# Patient Record
Sex: FEMALE | Race: WHITE | NOT HISPANIC OR LATINO | URBAN - METROPOLITAN AREA
[De-identification: names, ages, dates, MRNs, and addresses within clinical notes are randomized per-mention and may not be internally consistent; named-entity substitution may affect disease eponyms.]

---

## 2017-01-09 ENCOUNTER — AMBULATORY - HEALTHEAST (OUTPATIENT)
Dept: CARDIOLOGY | Facility: CLINIC | Age: 82
End: 2017-01-09

## 2017-01-11 ENCOUNTER — OFFICE VISIT - HEALTHEAST (OUTPATIENT)
Dept: CARDIOLOGY | Facility: CLINIC | Age: 82
End: 2017-01-11

## 2017-01-11 DIAGNOSIS — I25.10 CORONARY ATHEROSCLEROSIS: ICD-10-CM

## 2017-01-11 DIAGNOSIS — R07.2 PRECORDIAL PAIN: ICD-10-CM

## 2017-01-11 DIAGNOSIS — R55 NEAR SYNCOPE: ICD-10-CM

## 2017-01-11 DIAGNOSIS — R00.1 BRADYCARDIA BY ELECTROCARDIOGRAM: ICD-10-CM

## 2017-01-11 ASSESSMENT — MIFFLIN-ST. JEOR: SCORE: 1124.01

## 2017-01-13 ENCOUNTER — HOSPITAL ENCOUNTER (OUTPATIENT)
Dept: NUCLEAR MEDICINE | Facility: CLINIC | Age: 82
Discharge: HOME | End: 2017-01-13
Attending: INTERNAL MEDICINE

## 2017-01-13 ENCOUNTER — HOSPITAL ENCOUNTER (OUTPATIENT)
Dept: CARDIOLOGY | Facility: CLINIC | Age: 82
Discharge: HOME | End: 2017-01-13
Attending: INTERNAL MEDICINE

## 2017-01-13 DIAGNOSIS — R55 NEAR SYNCOPE: ICD-10-CM

## 2017-01-13 DIAGNOSIS — R00.1 BRADYCARDIA BY ELECTROCARDIOGRAM: ICD-10-CM

## 2017-01-13 DIAGNOSIS — R07.2 PRECORDIAL PAIN: ICD-10-CM

## 2017-01-13 LAB
CV STRESS CURRENT BP HE: NORMAL
CV STRESS CURRENT HR HE: 65
CV STRESS CURRENT HR HE: 69
CV STRESS CURRENT HR HE: 81
CV STRESS CURRENT HR HE: 82
CV STRESS CURRENT HR HE: 84
CV STRESS CURRENT HR HE: 84
CV STRESS CURRENT HR HE: 85
CV STRESS CURRENT HR HE: 86
CV STRESS CURRENT HR HE: 87
CV STRESS CURRENT HR HE: 88
CV STRESS CURRENT HR HE: 89
CV STRESS CURRENT HR HE: 90
CV STRESS CURRENT HR HE: 91
CV STRESS DEVIATION TIME HE: NORMAL
CV STRESS EXERCISE STAGE HE: NORMAL
CV STRESS FINAL RESTING BP HE: NORMAL
CV STRESS FINAL RESTING HR HE: 81
CV STRESS MAX TREADMILL GRADE HE: 0
CV STRESS MAX TREADMILL SPEED HE: 0
CV STRESS PEAK DIA BP HE: NORMAL
CV STRESS PEAK SYS BP HE: NORMAL
CV STRESS PHASE HE: NORMAL
CV STRESS PROTOCOL HE: NORMAL
CV STRESS RESTING PT POSITION HE: NORMAL
CV STRESS ST DEVIATION AMOUNT HE: NORMAL
CV STRESS ST DEVIATION ELEVATION HE: NORMAL
CV STRESS ST EVELATION AMOUNT HE: NORMAL
CV STRESS TEST TYPE HE: NORMAL
CV STRESS TOTAL STAGE TIME MIN 1 HE: NORMAL
NUC STRESS EJECTION FRACTION: 70 %
STRESS ECHO BASELINE BP: NORMAL
STRESS ECHO BASELINE HR: 67
STRESS ECHO LAST STRESS BP: NORMAL
STRESS ECHO LAST STRESS HR: 87

## 2017-01-19 ENCOUNTER — COMMUNICATION - HEALTHEAST (OUTPATIENT)
Dept: CARDIOLOGY | Facility: CLINIC | Age: 82
End: 2017-01-19

## 2017-01-26 ENCOUNTER — COMMUNICATION - HEALTHEAST (OUTPATIENT)
Dept: CARDIOLOGY | Facility: CLINIC | Age: 82
End: 2017-01-26

## 2017-11-02 ENCOUNTER — RECORDS - HEALTHEAST (OUTPATIENT)
Dept: LAB | Facility: CLINIC | Age: 82
End: 2017-11-02

## 2017-11-02 LAB
CHOLEST SERPL-MCNC: 138 MG/DL
FASTING STATUS PATIENT QL REPORTED: ABNORMAL
HDLC SERPL-MCNC: 38 MG/DL
LDLC SERPL CALC-MCNC: 70 MG/DL
TRIGL SERPL-MCNC: 149 MG/DL

## 2018-03-07 ENCOUNTER — COMMUNICATION - HEALTHEAST (OUTPATIENT)
Dept: ADMINISTRATIVE | Facility: CLINIC | Age: 83
End: 2018-03-07

## 2018-06-04 ENCOUNTER — AMBULATORY - HEALTHEAST (OUTPATIENT)
Dept: CARDIOLOGY | Facility: CLINIC | Age: 83
End: 2018-06-04

## 2018-06-04 ENCOUNTER — RECORDS - HEALTHEAST (OUTPATIENT)
Dept: ADMINISTRATIVE | Facility: OTHER | Age: 83
End: 2018-06-04

## 2018-06-05 ENCOUNTER — OFFICE VISIT - HEALTHEAST (OUTPATIENT)
Dept: CARDIOLOGY | Facility: CLINIC | Age: 83
End: 2018-06-05

## 2018-06-05 DIAGNOSIS — I25.10 ATHEROSCLEROSIS OF NATIVE CORONARY ARTERY OF NATIVE HEART WITHOUT ANGINA PECTORIS: ICD-10-CM

## 2018-06-05 ASSESSMENT — MIFFLIN-ST. JEOR: SCORE: 1124.01

## 2018-12-18 ENCOUNTER — RECORDS - HEALTHEAST (OUTPATIENT)
Dept: LAB | Facility: CLINIC | Age: 83
End: 2018-12-18

## 2018-12-18 LAB
ANION GAP SERPL CALCULATED.3IONS-SCNC: 9 MMOL/L (ref 5–18)
BUN SERPL-MCNC: 14 MG/DL (ref 8–28)
CALCIUM SERPL-MCNC: 9.8 MG/DL (ref 8.5–10.5)
CHLORIDE BLD-SCNC: 110 MMOL/L (ref 98–107)
CHOLEST SERPL-MCNC: 139 MG/DL
CO2 SERPL-SCNC: 23 MMOL/L (ref 22–31)
CREAT SERPL-MCNC: 0.84 MG/DL (ref 0.6–1.1)
FASTING STATUS PATIENT QL REPORTED: ABNORMAL
GFR SERPL CREATININE-BSD FRML MDRD: >60 ML/MIN/1.73M2
GLUCOSE BLD-MCNC: 100 MG/DL (ref 70–125)
HDLC SERPL-MCNC: 42 MG/DL
LDLC SERPL CALC-MCNC: 74 MG/DL
POTASSIUM BLD-SCNC: 4.5 MMOL/L (ref 3.5–5)
SODIUM SERPL-SCNC: 142 MMOL/L (ref 136–145)
TRIGL SERPL-MCNC: 115 MG/DL

## 2019-01-10 ENCOUNTER — AMBULATORY - HEALTHEAST (OUTPATIENT)
Dept: CARDIOLOGY | Facility: CLINIC | Age: 84
End: 2019-01-10

## 2019-01-11 ENCOUNTER — HOSPITAL ENCOUNTER (OUTPATIENT)
Dept: NUCLEAR MEDICINE | Facility: CLINIC | Age: 84
Discharge: HOME | End: 2019-01-11
Attending: INTERNAL MEDICINE

## 2019-01-11 ENCOUNTER — AMBULATORY - HEALTHEAST (OUTPATIENT)
Dept: CARDIOLOGY | Facility: CLINIC | Age: 84
End: 2019-01-11

## 2019-01-11 ENCOUNTER — RECORDS - HEALTHEAST (OUTPATIENT)
Dept: ADMINISTRATIVE | Facility: OTHER | Age: 84
End: 2019-01-11

## 2019-01-11 ENCOUNTER — OFFICE VISIT - HEALTHEAST (OUTPATIENT)
Dept: CARDIOLOGY | Facility: CLINIC | Age: 84
End: 2019-01-11

## 2019-01-11 ENCOUNTER — HOSPITAL ENCOUNTER (OUTPATIENT)
Dept: CARDIOLOGY | Facility: CLINIC | Age: 84
Discharge: HOME | End: 2019-01-11
Attending: INTERNAL MEDICINE

## 2019-01-11 DIAGNOSIS — R07.89 CHEST WALL PAIN: ICD-10-CM

## 2019-01-11 DIAGNOSIS — E78.5 DYSLIPIDEMIA, GOAL LDL BELOW 70: ICD-10-CM

## 2019-01-11 DIAGNOSIS — R94.39 ABNORMAL CARDIOVASCULAR STRESS TEST: ICD-10-CM

## 2019-01-11 DIAGNOSIS — I25.119 ATHEROSCLEROSIS OF NATIVE CORONARY ARTERY OF NATIVE HEART WITH ANGINA PECTORIS (H): ICD-10-CM

## 2019-01-11 DIAGNOSIS — Z86.73 HISTORY OF STROKE: ICD-10-CM

## 2019-01-11 DIAGNOSIS — R07.2 PRECORDIAL CHEST PAIN: ICD-10-CM

## 2019-01-11 DIAGNOSIS — I10 ESSENTIAL HYPERTENSION: ICD-10-CM

## 2019-01-11 LAB
CV STRESS CURRENT BP HE: NORMAL
CV STRESS CURRENT HR HE: 57
CV STRESS CURRENT HR HE: 59
CV STRESS CURRENT HR HE: 59
CV STRESS CURRENT HR HE: 62
CV STRESS CURRENT HR HE: 72
CV STRESS CURRENT HR HE: 76
CV STRESS CURRENT HR HE: 76
CV STRESS CURRENT HR HE: 77
CV STRESS CURRENT HR HE: 77
CV STRESS CURRENT HR HE: 79
CV STRESS CURRENT HR HE: 80
CV STRESS CURRENT HR HE: 81
CV STRESS DEVIATION TIME HE: NORMAL
CV STRESS ECHO PERCENT HR HE: NORMAL
CV STRESS EXERCISE STAGE HE: NORMAL
CV STRESS FINAL RESTING BP HE: NORMAL
CV STRESS FINAL RESTING HR HE: 76
CV STRESS MAX HR HE: 82
CV STRESS MAX TREADMILL GRADE HE: 0
CV STRESS MAX TREADMILL SPEED HE: 0
CV STRESS PEAK DIA BP HE: NORMAL
CV STRESS PEAK SYS BP HE: NORMAL
CV STRESS PHASE HE: NORMAL
CV STRESS PROTOCOL HE: NORMAL
CV STRESS RESTING PT POSITION HE: NORMAL
CV STRESS RESTING PT POSITION HE: NORMAL
CV STRESS ST DEVIATION AMOUNT HE: NORMAL
CV STRESS ST DEVIATION ELEVATION HE: NORMAL
CV STRESS ST EVELATION AMOUNT HE: NORMAL
CV STRESS TEST TYPE HE: NORMAL
CV STRESS TOTAL STAGE TIME MIN 1 HE: NORMAL
NUC STRESS EJECTION FRACTION: 65 %
STRESS ECHO BASELINE BP: NORMAL
STRESS ECHO BASELINE HR: 59
STRESS ECHO CALCULATED PERCENT HR: 60 %
STRESS ECHO LAST STRESS BP: NORMAL
STRESS ECHO LAST STRESS HR: 81

## 2019-01-11 ASSESSMENT — MIFFLIN-ST. JEOR: SCORE: 1101.33

## 2019-01-14 ENCOUNTER — COMMUNICATION - HEALTHEAST (OUTPATIENT)
Dept: CARDIOLOGY | Facility: CLINIC | Age: 84
End: 2019-01-14

## 2019-01-14 DIAGNOSIS — I20.0 UNSTABLE ANGINA (H): ICD-10-CM

## 2019-01-22 ENCOUNTER — SURGERY - HEALTHEAST (OUTPATIENT)
Dept: CARDIOLOGY | Facility: CLINIC | Age: 84
End: 2019-01-22

## 2019-01-22 ENCOUNTER — AMBULATORY - HEALTHEAST (OUTPATIENT)
Dept: CARDIOLOGY | Facility: CLINIC | Age: 84
End: 2019-01-22

## 2019-01-22 ASSESSMENT — MIFFLIN-ST. JEOR
SCORE: 1098.6
SCORE: 1098.6

## 2019-01-23 ENCOUNTER — AMBULATORY - HEALTHEAST (OUTPATIENT)
Dept: CARDIOLOGY | Facility: CLINIC | Age: 84
End: 2019-01-23

## 2019-01-23 ENCOUNTER — ANESTHESIA - HEALTHEAST (OUTPATIENT)
Dept: SURGERY | Facility: CLINIC | Age: 84
End: 2019-01-23

## 2019-01-23 DIAGNOSIS — I65.29 STENOSIS OF CAROTID ARTERY, UNSPECIFIED LATERALITY: ICD-10-CM

## 2019-01-24 ENCOUNTER — SURGERY - HEALTHEAST (OUTPATIENT)
Dept: SURGERY | Facility: CLINIC | Age: 84
End: 2019-01-24

## 2019-01-24 ASSESSMENT — MIFFLIN-ST. JEOR
SCORE: 1093.61
SCORE: 1093.61

## 2019-01-25 ASSESSMENT — MIFFLIN-ST. JEOR
SCORE: 1131.26
SCORE: 1131.26

## 2019-01-26 ASSESSMENT — MIFFLIN-ST. JEOR
SCORE: 1138.07
SCORE: 1138.07

## 2019-01-27 ASSESSMENT — MIFFLIN-ST. JEOR
SCORE: 1156.04
SCORE: 1156.04

## 2019-01-28 ASSESSMENT — MIFFLIN-ST. JEOR
SCORE: 1152.58
SCORE: 1152.58

## 2019-01-29 ASSESSMENT — MIFFLIN-ST. JEOR
SCORE: 1159.39
SCORE: 1159.39

## 2019-02-06 ENCOUNTER — RECORDS - HEALTHEAST (OUTPATIENT)
Dept: LAB | Facility: CLINIC | Age: 84
End: 2019-02-06

## 2019-02-06 LAB
ALBUMIN SERPL-MCNC: 3.6 G/DL (ref 3.5–5)
ANION GAP SERPL CALCULATED.3IONS-SCNC: 12 MMOL/L (ref 5–18)
BUN SERPL-MCNC: 18 MG/DL (ref 8–28)
CALCIUM SERPL-MCNC: 10 MG/DL (ref 8.5–10.5)
CHLORIDE BLD-SCNC: 108 MMOL/L (ref 98–107)
CO2 SERPL-SCNC: 20 MMOL/L (ref 22–31)
CREAT SERPL-MCNC: 1.07 MG/DL (ref 0.6–1.1)
ERYTHROCYTE [DISTWIDTH] IN BLOOD BY AUTOMATED COUNT: 15.3 % (ref 11–14.5)
GFR SERPL CREATININE-BSD FRML MDRD: 49 ML/MIN/1.73M2
GLUCOSE BLD-MCNC: 103 MG/DL (ref 70–125)
HCT VFR BLD AUTO: 36.2 % (ref 35–47)
HGB BLD-MCNC: 11.1 G/DL (ref 12–16)
MCH RBC QN AUTO: 28.8 PG (ref 27–34)
MCHC RBC AUTO-ENTMCNC: 30.7 G/DL (ref 32–36)
MCV RBC AUTO: 94 FL (ref 80–100)
PHOSPHATE SERPL-MCNC: 3.7 MG/DL (ref 2.5–4.5)
PLATELET # BLD AUTO: 567 THOU/UL (ref 140–440)
PMV BLD AUTO: 10.8 FL (ref 8.5–12.5)
POTASSIUM BLD-SCNC: 4.9 MMOL/L (ref 3.5–5)
RBC # BLD AUTO: 3.86 MILL/UL (ref 3.8–5.4)
SODIUM SERPL-SCNC: 140 MMOL/L (ref 136–145)
WBC: 10.9 THOU/UL (ref 4–11)

## 2019-02-19 ENCOUNTER — OFFICE VISIT - HEALTHEAST (OUTPATIENT)
Dept: CARDIOLOGY | Facility: CLINIC | Age: 84
End: 2019-02-19

## 2019-02-19 DIAGNOSIS — Z95.1 S/P CABG (CORONARY ARTERY BYPASS GRAFT): ICD-10-CM

## 2019-02-19 RX ORDER — METOPROLOL TARTRATE 25 MG/1
12.5 TABLET, FILM COATED ORAL DAILY
Status: SHIPPED | COMMUNITY
Start: 2019-02-19

## 2019-02-19 ASSESSMENT — MIFFLIN-ST. JEOR: SCORE: 1086.81

## 2019-02-20 ENCOUNTER — RECORDS - HEALTHEAST (OUTPATIENT)
Dept: LAB | Facility: CLINIC | Age: 84
End: 2019-02-20

## 2019-02-20 LAB
ANION GAP SERPL CALCULATED.3IONS-SCNC: 8 MMOL/L (ref 5–18)
BUN SERPL-MCNC: 18 MG/DL (ref 8–28)
CALCIUM SERPL-MCNC: 10.4 MG/DL (ref 8.5–10.5)
CHLORIDE BLD-SCNC: 110 MMOL/L (ref 98–107)
CO2 SERPL-SCNC: 25 MMOL/L (ref 22–31)
CREAT SERPL-MCNC: 0.91 MG/DL (ref 0.6–1.1)
GFR SERPL CREATININE-BSD FRML MDRD: 59 ML/MIN/1.73M2
GLUCOSE BLD-MCNC: 98 MG/DL (ref 70–125)
POTASSIUM BLD-SCNC: 4.8 MMOL/L (ref 3.5–5)
SODIUM SERPL-SCNC: 143 MMOL/L (ref 136–145)

## 2019-03-05 ENCOUNTER — AMBULATORY - HEALTHEAST (OUTPATIENT)
Dept: CARDIOLOGY | Facility: CLINIC | Age: 84
End: 2019-03-05

## 2019-03-05 ENCOUNTER — RECORDS - HEALTHEAST (OUTPATIENT)
Dept: ADMINISTRATIVE | Facility: OTHER | Age: 84
End: 2019-03-05

## 2019-03-11 ENCOUNTER — HOSPITAL ENCOUNTER (OUTPATIENT)
Dept: ULTRASOUND IMAGING | Facility: CLINIC | Age: 84
Discharge: HOME | End: 2019-03-11
Attending: INTERNAL MEDICINE

## 2019-03-11 ENCOUNTER — OFFICE VISIT - HEALTHEAST (OUTPATIENT)
Dept: CARDIOLOGY | Facility: CLINIC | Age: 84
End: 2019-03-11

## 2019-03-11 DIAGNOSIS — I25.10 CORONARY ARTERY DISEASE DUE TO CALCIFIED CORONARY LESION: ICD-10-CM

## 2019-03-11 DIAGNOSIS — Z95.1 STATUS POST CARDIAC REVASCULARIZATION WITH BYPASS AORTOCORONARY ANASTOMOSIS OF FIVE CORONARY VESSELS: ICD-10-CM

## 2019-03-11 DIAGNOSIS — I82.402 ACUTE EMBOLISM AND THROMBOSIS OF DEEP VEIN OF LEFT LOWER EXTREMITY (H): ICD-10-CM

## 2019-03-11 DIAGNOSIS — I25.84 CORONARY ARTERY DISEASE DUE TO CALCIFIED CORONARY LESION: ICD-10-CM

## 2019-03-11 ASSESSMENT — MIFFLIN-ST. JEOR: SCORE: 1092.25

## 2019-03-19 ENCOUNTER — AMBULATORY - HEALTHEAST (OUTPATIENT)
Dept: CARDIAC REHAB | Facility: CLINIC | Age: 84
End: 2019-03-19

## 2019-06-17 ENCOUNTER — COMMUNICATION - HEALTHEAST (OUTPATIENT)
Dept: ADMINISTRATIVE | Facility: CLINIC | Age: 84
End: 2019-06-17

## 2019-12-12 ENCOUNTER — RECORDS - HEALTHEAST (OUTPATIENT)
Dept: LAB | Facility: CLINIC | Age: 84
End: 2019-12-12

## 2019-12-12 LAB
ANION GAP SERPL CALCULATED.3IONS-SCNC: 7 MMOL/L (ref 5–18)
BUN SERPL-MCNC: 13 MG/DL (ref 8–28)
CALCIUM SERPL-MCNC: 9.8 MG/DL (ref 8.5–10.5)
CHLORIDE BLD-SCNC: 111 MMOL/L (ref 98–107)
CHOLEST SERPL-MCNC: 111 MG/DL
CO2 SERPL-SCNC: 24 MMOL/L (ref 22–31)
CREAT SERPL-MCNC: 0.87 MG/DL (ref 0.6–1.1)
FASTING STATUS PATIENT QL REPORTED: ABNORMAL
GFR SERPL CREATININE-BSD FRML MDRD: >60 ML/MIN/1.73M2
GLUCOSE BLD-MCNC: 120 MG/DL (ref 70–125)
HDLC SERPL-MCNC: 36 MG/DL
LDLC SERPL CALC-MCNC: 50 MG/DL
POTASSIUM BLD-SCNC: 4.6 MMOL/L (ref 3.5–5)
SODIUM SERPL-SCNC: 142 MMOL/L (ref 136–145)
TRIGL SERPL-MCNC: 126 MG/DL

## 2020-05-13 ENCOUNTER — COMMUNICATION - HEALTHEAST (OUTPATIENT)
Dept: CARDIOLOGY | Facility: CLINIC | Age: 85
End: 2020-05-13

## 2020-05-14 ENCOUNTER — RECORDS - HEALTHEAST (OUTPATIENT)
Dept: ADMINISTRATIVE | Facility: OTHER | Age: 85
End: 2020-05-14

## 2020-05-14 ENCOUNTER — AMBULATORY - HEALTHEAST (OUTPATIENT)
Dept: CARDIOLOGY | Facility: CLINIC | Age: 85
End: 2020-05-14

## 2020-05-18 ENCOUNTER — OFFICE VISIT - HEALTHEAST (OUTPATIENT)
Dept: CARDIOLOGY | Facility: CLINIC | Age: 85
End: 2020-05-18

## 2020-05-18 DIAGNOSIS — Z95.1 S/P CABG (CORONARY ARTERY BYPASS GRAFT): ICD-10-CM

## 2020-05-18 DIAGNOSIS — R07.9 CHEST PAIN, UNSPECIFIED TYPE: ICD-10-CM

## 2020-05-18 RX ORDER — PRAVASTATIN SODIUM 80 MG/1
80 TABLET ORAL AT BEDTIME
Status: SHIPPED | COMMUNITY
Start: 2020-02-21

## 2020-05-18 ASSESSMENT — MIFFLIN-ST. JEOR: SCORE: 1092.25

## 2020-05-21 ENCOUNTER — HOSPITAL ENCOUNTER (OUTPATIENT)
Dept: NUCLEAR MEDICINE | Facility: CLINIC | Age: 85
Discharge: HOME | End: 2020-05-21
Attending: INTERNAL MEDICINE

## 2020-05-21 ENCOUNTER — HOSPITAL ENCOUNTER (OUTPATIENT)
Dept: CARDIOLOGY | Facility: CLINIC | Age: 85
Discharge: HOME | End: 2020-05-21
Attending: INTERNAL MEDICINE

## 2020-05-21 DIAGNOSIS — R07.9 CHEST PAIN, UNSPECIFIED TYPE: ICD-10-CM

## 2020-05-21 DIAGNOSIS — Z95.1 S/P CABG (CORONARY ARTERY BYPASS GRAFT): ICD-10-CM

## 2020-05-21 LAB
CV STRESS CURRENT BP HE: NORMAL
CV STRESS CURRENT HR HE: 53
CV STRESS CURRENT HR HE: 53
CV STRESS CURRENT HR HE: 56
CV STRESS CURRENT HR HE: 70
CV STRESS CURRENT HR HE: 70
CV STRESS CURRENT HR HE: 71
CV STRESS CURRENT HR HE: 73
CV STRESS CURRENT HR HE: 74
CV STRESS CURRENT HR HE: 74
CV STRESS CURRENT HR HE: 75
CV STRESS CURRENT HR HE: 77
CV STRESS CURRENT HR HE: 77
CV STRESS CURRENT HR HE: 78
CV STRESS CURRENT HR HE: 79
CV STRESS CURRENT HR HE: 81
CV STRESS DEVIATION TIME HE: NORMAL
CV STRESS ECHO PERCENT HR HE: NORMAL
CV STRESS EXERCISE STAGE HE: NORMAL
CV STRESS FINAL RESTING BP HE: NORMAL
CV STRESS FINAL RESTING HR HE: 70
CV STRESS MAX HR HE: 82
CV STRESS MAX TREADMILL GRADE HE: 0
CV STRESS MAX TREADMILL SPEED HE: 0
CV STRESS PEAK DIA BP HE: NORMAL
CV STRESS PEAK SYS BP HE: NORMAL
CV STRESS PHASE HE: NORMAL
CV STRESS PROTOCOL HE: NORMAL
CV STRESS RESTING PT POSITION HE: NORMAL
CV STRESS RESTING PT POSITION HE: NORMAL
CV STRESS ST DEVIATION AMOUNT HE: NORMAL
CV STRESS ST DEVIATION ELEVATION HE: NORMAL
CV STRESS ST EVELATION AMOUNT HE: NORMAL
CV STRESS TEST TYPE HE: NORMAL
CV STRESS TOTAL STAGE TIME MIN 1 HE: NORMAL
RATE PRESSURE PRODUCT: NORMAL
STRESS ECHO BASELINE DIASTOLIC HE: 82
STRESS ECHO BASELINE HR: 52
STRESS ECHO BASELINE SYSTOLIC BP: 190
STRESS ECHO CALCULATED PERCENT HR: 60 %
STRESS ECHO LAST STRESS DIASTOLIC BP: 75
STRESS ECHO LAST STRESS HR: 79
STRESS ECHO LAST STRESS SYSTOLIC BP: 169
STRESS ECHO TARGET HR: 136

## 2020-06-29 ENCOUNTER — RECORDS - HEALTHEAST (OUTPATIENT)
Dept: ADMINISTRATIVE | Facility: OTHER | Age: 85
End: 2020-06-29

## 2020-07-02 ENCOUNTER — OFFICE VISIT - HEALTHEAST (OUTPATIENT)
Dept: CARDIOLOGY | Facility: CLINIC | Age: 85
End: 2020-07-02

## 2020-07-02 DIAGNOSIS — I25.84 CORONARY ARTERY DISEASE DUE TO CALCIFIED CORONARY LESION: ICD-10-CM

## 2020-07-02 DIAGNOSIS — E78.00 HYPERCHOLESTEROLEMIA: ICD-10-CM

## 2020-07-02 DIAGNOSIS — Z95.1 STATUS POST CARDIAC REVASCULARIZATION WITH BYPASS AORTOCORONARY ANASTOMOSIS OF FIVE CORONARY VESSELS: ICD-10-CM

## 2020-07-02 DIAGNOSIS — I25.10 CORONARY ARTERY DISEASE DUE TO CALCIFIED CORONARY LESION: ICD-10-CM

## 2020-07-02 DIAGNOSIS — K21.00 GASTRO-ESOPHAGEAL REFLUX DISEASE WITH ESOPHAGITIS: ICD-10-CM

## 2020-12-10 ENCOUNTER — RECORDS - HEALTHEAST (OUTPATIENT)
Dept: LAB | Facility: CLINIC | Age: 85
End: 2020-12-10

## 2020-12-10 LAB
ANION GAP SERPL CALCULATED.3IONS-SCNC: 8 MMOL/L (ref 5–18)
BUN SERPL-MCNC: 14 MG/DL (ref 8–28)
CALCIUM SERPL-MCNC: 9.1 MG/DL (ref 8.5–10.5)
CHLORIDE BLD-SCNC: 112 MMOL/L (ref 98–107)
CHOLEST SERPL-MCNC: 122 MG/DL
CO2 SERPL-SCNC: 24 MMOL/L (ref 22–31)
CREAT SERPL-MCNC: 0.84 MG/DL (ref 0.6–1.1)
FASTING STATUS PATIENT QL REPORTED: ABNORMAL
GFR SERPL CREATININE-BSD FRML MDRD: >60 ML/MIN/1.73M2
GLUCOSE BLD-MCNC: 141 MG/DL (ref 70–125)
HDLC SERPL-MCNC: 42 MG/DL
LDLC SERPL CALC-MCNC: 60 MG/DL
POTASSIUM BLD-SCNC: 4.2 MMOL/L (ref 3.5–5)
SODIUM SERPL-SCNC: 144 MMOL/L (ref 136–145)
TRIGL SERPL-MCNC: 102 MG/DL

## 2020-12-29 ENCOUNTER — RECORDS - HEALTHEAST (OUTPATIENT)
Dept: LAB | Facility: CLINIC | Age: 85
End: 2020-12-29

## 2020-12-29 LAB
ERYTHROCYTE [SEDIMENTATION RATE] IN BLOOD BY WESTERGREN METHOD: 8 MM/HR (ref 0–20)
TSH SERPL DL<=0.005 MIU/L-ACNC: 3.04 UIU/ML (ref 0.3–5)

## 2021-01-07 ENCOUNTER — RECORDS - HEALTHEAST (OUTPATIENT)
Dept: LAB | Facility: CLINIC | Age: 86
End: 2021-01-07

## 2021-01-07 LAB
ANION GAP SERPL CALCULATED.3IONS-SCNC: 7 MMOL/L (ref 5–18)
BUN SERPL-MCNC: 15 MG/DL (ref 8–28)
CALCIUM SERPL-MCNC: 9.8 MG/DL (ref 8.5–10.5)
CHLORIDE BLD-SCNC: 112 MMOL/L (ref 98–107)
CO2 SERPL-SCNC: 26 MMOL/L (ref 22–31)
CREAT SERPL-MCNC: 0.97 MG/DL (ref 0.6–1.1)
GFR SERPL CREATININE-BSD FRML MDRD: 55 ML/MIN/1.73M2
GLUCOSE BLD-MCNC: 140 MG/DL (ref 70–125)
POTASSIUM BLD-SCNC: 5 MMOL/L (ref 3.5–5)
SODIUM SERPL-SCNC: 145 MMOL/L (ref 136–145)

## 2021-02-11 ENCOUNTER — RECORDS - HEALTHEAST (OUTPATIENT)
Dept: ADMINISTRATIVE | Facility: OTHER | Age: 86
End: 2021-02-11

## 2021-02-19 ENCOUNTER — OFFICE VISIT - HEALTHEAST (OUTPATIENT)
Dept: CARDIOLOGY | Facility: CLINIC | Age: 86
End: 2021-02-19

## 2021-02-19 DIAGNOSIS — Z95.1 STATUS POST CARDIAC REVASCULARIZATION WITH BYPASS AORTOCORONARY ANASTOMOSIS OF FIVE CORONARY VESSELS: ICD-10-CM

## 2021-02-19 DIAGNOSIS — I10 ESSENTIAL HYPERTENSION, BENIGN: ICD-10-CM

## 2021-02-19 DIAGNOSIS — E78.5 HYPERLIPIDEMIA, UNSPECIFIED HYPERLIPIDEMIA TYPE: ICD-10-CM

## 2021-02-19 ASSESSMENT — MIFFLIN-ST. JEOR: SCORE: 1128.54

## 2021-05-07 ENCOUNTER — COMMUNICATION - HEALTHEAST (OUTPATIENT)
Dept: ADMINISTRATIVE | Facility: CLINIC | Age: 86
End: 2021-05-07

## 2021-05-28 ENCOUNTER — RECORDS - HEALTHEAST (OUTPATIENT)
Dept: ADMINISTRATIVE | Facility: CLINIC | Age: 86
End: 2021-05-28

## 2021-05-29 ENCOUNTER — RECORDS - HEALTHEAST (OUTPATIENT)
Dept: ADMINISTRATIVE | Facility: CLINIC | Age: 86
End: 2021-05-29

## 2021-05-30 ENCOUNTER — RECORDS - HEALTHEAST (OUTPATIENT)
Dept: ADMINISTRATIVE | Facility: CLINIC | Age: 86
End: 2021-05-30

## 2021-05-30 VITALS — HEIGHT: 62 IN | BODY MASS INDEX: 29.44 KG/M2 | WEIGHT: 160 LBS

## 2021-05-31 ENCOUNTER — RECORDS - HEALTHEAST (OUTPATIENT)
Dept: ADMINISTRATIVE | Facility: CLINIC | Age: 86
End: 2021-05-31

## 2021-06-01 VITALS — WEIGHT: 160 LBS | HEIGHT: 62 IN | BODY MASS INDEX: 29.44 KG/M2

## 2021-06-02 VITALS — BODY MASS INDEX: 28.52 KG/M2 | WEIGHT: 155 LBS | HEIGHT: 62 IN

## 2021-06-02 VITALS — BODY MASS INDEX: 27.94 KG/M2 | WEIGHT: 151.8 LBS | HEIGHT: 62 IN

## 2021-06-02 VITALS — HEIGHT: 62 IN | WEIGHT: 153 LBS | BODY MASS INDEX: 28.16 KG/M2

## 2021-06-02 VITALS
WEIGHT: 167.8 LBS | HEIGHT: 62 IN | BODY MASS INDEX: 30.88 KG/M2 | HEIGHT: 62 IN | WEIGHT: 167.8 LBS | BODY MASS INDEX: 30.88 KG/M2

## 2021-06-04 VITALS
SYSTOLIC BLOOD PRESSURE: 146 MMHG | WEIGHT: 153 LBS | RESPIRATION RATE: 16 BRPM | BODY MASS INDEX: 28.16 KG/M2 | HEART RATE: 52 BPM | DIASTOLIC BLOOD PRESSURE: 62 MMHG | HEIGHT: 62 IN

## 2021-06-05 VITALS
DIASTOLIC BLOOD PRESSURE: 58 MMHG | HEIGHT: 62 IN | RESPIRATION RATE: 14 BRPM | HEART RATE: 76 BPM | SYSTOLIC BLOOD PRESSURE: 118 MMHG | BODY MASS INDEX: 29.63 KG/M2 | WEIGHT: 161 LBS

## 2021-06-08 NOTE — TELEPHONE ENCOUNTER
Wellness Screening Tool  Symptom Screening:  Do you have one of the following new symptoms:    Fever or reported chills?  No    A new cough (started within the past 14 days)?  No    Shortness of breath (started within the past 14 days) ?  No     Nausea, vomiting, or diarrhea?  No    Within the past 3 weeks, have you been exposed to someone with a known positive illness below:    COVID-19 (known or suspected)?  No    Chicken pox?  No    Mealses?  No    Pertussis?  No    Patient notified of visitor restrictions:  Yes  If pt asymptomatic, please remind patient to bring in and wear their own mask if they have one available to their appointment: Yes  Patient's appointment status: Patient will be seen in clinic as scheduled on 5/14/20. -opal

## 2021-06-08 NOTE — TELEPHONE ENCOUNTER
----- Message -----  From: CharlesRita foreman  Sent: 5/14/2020   1:31 PM CDT  To: Mee Meeks RN  Subject: Incoming provider call                           Caller: Syl Johnson NP     Primary cardiologist: Dr. Vivas    Detailed reason for call: Syl states Ileana had appt with her yesterday and she has faxed the notes to Dr. Vivas. However, she is concerned Ileana should be seen sooner than the already scheduled 5/28/2020 appt w/cardiology. Please call back today.     Best phone number: [phone]    Best time to contact: Today    Ok to leave a detailed message? Yes    -----------------------------------------------------    Return call to Syl Navarro NP at \[facility]\"". She would like patient evaluated in-clinic sooner than her currently scheduled phone appointment with Dr. Vivas. Patient described symptoms yesterday in visit of abdominal fullness. EKG done at that visit with reported changes. NP concerned patient having angina. Imdur started at 30 mg daily and Metoprolol Tartrate reduced to 12.5 mg daily secondary to some bradycardia.    Patient contacted and assisted in arranging RAC appointment-- she is currently scheduled for tomorrow. She is concerned that she cannot find someone to stay with her ailing  and will call if she needs to reschedule. -opal

## 2021-06-08 NOTE — TELEPHONE ENCOUNTER
"----- Message from Rita Charles sent at 5/13/2020  9:56 AM CDT -----  Regarding: [facility] Pt  Caller: Ileana    Primary cardiologist: Dr. Vivas    Detailed reason for call: Ileana states she is concerned about her low heart rate and high blood pressure. She declined making Video or Tele visit at this time. Please call back.     Best phone number: [phone]    Best time to contact: Today    Ok to leave a detailed message? Yes    Device? N    Additional info: Pt due for f/u with Dr. Vivas March 2020, wants to \"wait for in-office\".  -----------------------------------------------------    Return call to patient to discuss concerns. She reports that her husbands home care nurse took her pulse yesterday an it was 48 bpm. This prompted patient to check her BP which she reports was elevated-- SBP in the 190's. She has since been taking her BP about every hour since then and it continues to be elevated and HR is low. Patient states \"I feel pretty good\", but is concerned about these readings. Advised patient to contact her PCP for evaluation and suggested that she take her BP cuff in to her appointment to check it's accuracy. Explained to patient that PCP can determine need for more urgent cardiology appointment if necessary. Encouraged patient not to check her BP every hour unless she had symptoms-- twice daily and record her readings. Also suggested she schedule follow-up with Dr. Vivas as she is overdue-- will have scheduling staff call patient to arrange. Before ending call reviewed with patient s/s that would require emergent eval. Patient verbalized understanding and agreement with plan.    Dr. Vivas, any other recommendations? -ejb    -----------------------------------------------------  ----- Message -----  From: Aramis Vivas MD  Sent: 5/13/2020  11:59 AM CDT  To: Mee Meeks RN    She should have video visit with Jaymie this week    Return call to patient. She was able to schedule a visit today with " a provider at PCP office. She does not have video capability, but is willing to do Tele Visit. Spoke with scheduling staff and asked that they contact patient to arrange. -opal

## 2021-06-09 ENCOUNTER — RECORDS - HEALTHEAST (OUTPATIENT)
Dept: ADMINISTRATIVE | Facility: CLINIC | Age: 86
End: 2021-06-09

## 2021-06-15 PROBLEM — R00.1 BRADYCARDIA BY ELECTROCARDIOGRAM: Status: ACTIVE | Noted: 2017-01-11

## 2021-06-15 PROBLEM — R07.2 PRECORDIAL CHEST PAIN: Status: ACTIVE | Noted: 2017-01-11

## 2021-06-16 PROBLEM — S06.2XAA: Status: ACTIVE | Noted: 2019-01-30

## 2021-06-16 PROBLEM — R94.39 ABNORMAL CARDIOVASCULAR STRESS TEST: Status: ACTIVE | Noted: 2019-01-11

## 2021-06-16 PROBLEM — Z86.73 HISTORY OF STROKE: Status: ACTIVE | Noted: 2019-01-11

## 2021-06-16 PROBLEM — K21.00 GASTRO-ESOPHAGEAL REFLUX DISEASE WITH ESOPHAGITIS: Status: ACTIVE | Noted: 2019-01-30

## 2021-06-16 PROBLEM — R07.89 CHEST WALL PAIN: Status: ACTIVE | Noted: 2019-01-11

## 2021-06-16 PROBLEM — Z95.1 STATUS POST CARDIAC REVASCULARIZATION WITH BYPASS AORTOCORONARY ANASTOMOSIS OF FIVE CORONARY VESSELS: Status: ACTIVE | Noted: 2019-03-11

## 2021-06-18 NOTE — PATIENT INSTRUCTIONS - HE
Patient Instructions by Tyrone Tam MD at 5/18/2020  7:50 AM     Author: Tyrone Tam MD Service: -- Author Type: Physician    Filed: 5/18/2020  8:34 AM Encounter Date: 5/18/2020 Status: Addendum    : Tyrone Tam MD (Physician)    Related Notes: Original Note by Tyrone Tam MD (Physician) filed at 5/18/2020  8:32 AM       It was a pleasure to meet with you today.      Below is a summary of your visit.   1. Schedule a stress test to make sure that your chest discomfort is not coming from your heart. Do not take your metoprolol the morning of the test  2. Continue all other medications as prescribed.   3. We can reschedule your appointment with Dr. Vivas for about 6-8 weeks.    You should receive a phone call from this office informing you of test or procedure results within 3 business days of the test being performed.  If you do not hear from our office with the test results within 1 week please do not hesitate to call asking for these results.     Please do not hesitate to call the [facility] clinic with any questions or concerns at [phone].    Sincerely,

## 2021-06-18 NOTE — PATIENT INSTRUCTIONS - HE
Patient Instructions by Aramis Vivas MD at 2/19/2021  1:30 PM     Author: Aramis Vivas MD Service: -- Author Type: Physician    Filed: 2/19/2021  2:06 PM Encounter Date: 2/19/2021 Status: Signed    : Aramis Vivas MD (Physician)                     Ileana Perez,    It was a pleasure to see you today at the [facility].     My recommendations after this visit include:    1.  Would recommend increasing your activity with walking around your home for 10 minutes twice each day.    2.  No changes in your medication.    3.  I would recommend getting the vaccine if it is available to you.  I will include some computer links below in case one of your children want to check this out.      - Get a COVID vaccine. Here are ways to get scheduled...  1. Go to the website  [place]://[place].gov[place]id[place]vaccine[place]connector/ and register for a COVID vaccine at a local site.  2. Enroll in [employer] [facility] and log in daily to see if new vaccination appointments are available. The availability will change daily.    3. If you are a  call the [facility] to schedule a vaccine.            Aramis Vivas

## 2021-06-19 NOTE — LETTER
Letter by Aramis Vivas MD at      Author: Aramis Vivas MD Service: -- Author Type: --    Filed:  Encounter Date: 6/17/2019 Status: (Other)         Ileana Perez  [address] [address]      June 17, 2019      Dear Ileana,    This letter is to remind you that you will be due for your follow up appointment with Dr. Aramis Vivas  . To help ensure you are in the best health possible, a regular follow-up with your cardiologist is essential.     Please call our Patient Scheduling Line at [phone] to schedule your appointment at your earliest convenience.  If you have recently scheduled an appointment, please disregard this letter.    We look forward to seeing you again. As always, we are available at the number  above for any questions or concerns you may have.      Sincerely,     The Physicians and Staff of [facility]

## 2021-06-21 NOTE — LETTER
Letter by Jaymie Theodore NP at      Author: Jaymie Theodore NP Service: -- Author Type: --    Filed:  Encounter Date: 5/7/2021 Status: (Other)         Ileana Perez  [address] [address]      May 7, 2021      Dear Ileana,    This letter is to remind you that you will be due for your follow up appointment with Jaymie Theodore NP in July, 2021. To help ensure you are in the best health possible, a regular follow-up with your cardiologist is essential.     Please call our Patient Scheduling Line at [phone] to schedule your appointment at your earliest convenience.  If you have recently scheduled an appointment, please disregard this letter.    We look forward to seeing you again. As always, we are available at the number  above for any questions or concerns you may have.      Sincerely,     The Physicians and Staff of [facility]

## 2021-06-23 NOTE — ANESTHESIA PREPROCEDURE EVALUATION
Anesthesia Evaluation      Patient summary reviewed   No history of anesthetic complications     Airway   Mallampati: III  Neck ROM: full   Pulmonary - negative ROS and normal exam                          Cardiovascular - normal exam  (+) hypertension, CAD, CABG/stent (s/p stent), angina (unstable) at rest, , hypercholesterolemia,     ECG reviewed        Neuro/Psych    (+) CVA (no residual) , chronic pain (low back )    Endo/Other - negative ROS      GI/Hepatic/Renal - negative ROS           Dental    (+) caps and bridge                       Anesthesia Plan  Planned anesthetic: general endotracheal  A line  CVC with PA-C  OSEAS  Methadone  precedex  ASA 3   Induction: intravenous   Anesthetic plan and risks discussed with: patient  Anesthesia plan special considerations: antiemetics, CVP line, arterial catheterization, pulmonary artery catheterization, dexmedetomidine  Post-op plan: routine recovery and extended intubation/vent support        Chemistry        Component Value Date/Time     01/22/2019 0706    K 4.2 01/23/2019 0813     (H) 01/22/2019 0706    CO2 18 (L) 01/22/2019 0706    BUN 16 01/22/2019 0706    CREATININE 0.88 01/22/2019 0706     01/22/2019 0706        Component Value Date/Time    CALCIUM 9.6 01/22/2019 0706    ALKPHOS 110 11/02/2017 1015    AST 15 11/02/2017 1015    ALT 16 11/02/2017 1015    BILITOT 0.3 11/02/2017 1015        Lab Results   Component Value Date    WBC 7.0 01/23/2019    HGB 13.3 01/23/2019    HCT 40.8 01/23/2019    MCV 88 01/23/2019     01/23/2019     Lab Results   Component Value Date    INR 0.98 01/23/2019    INR 0.94 10/21/2012    INR 0.84 (L) 11/30/2009           Echo: 1/22/19:  Summary       1.Left ventricle ejection fraction is normal. The calculated left ventricular ejection fraction is 60%.    2.TAPSE is normal, which is consistent with normal right ventricular systolic function.    3.Ascending aorta upper limits of normal in size.    4.No  hemodynamically significant valvular heart abnormalities.    5.When compared to the previous study dated 10/12/2012, no significant change.     Cath: 1/22/19:  Conclusion       Unstable angina and abnormal stress test in patient with three previous PCIs of the proximal LAD, the last being for instent restenosis in 2012.    Critical ostial LAD stenosis, just proximal to/at the proximal stent edge. Patent stents otherwise. Moderate mid LAD disease.    Moderate-severe circumflex and right coronary artery disease.    LV EDP and LV EF grossly normal.    Blood loss < 20 cc          Carotid u/s: 1/22/19:  IMPRESSION:   CONCLUSION:  1.  Mild right and moderate left atheromatous plaque in the carotid arteries.  2.  Peak systolic velocities compatible with 50-69% stenosis within the left internal carotid artery.     Evaluation based on velocities and NASCET criteria

## 2021-06-23 NOTE — ANESTHESIA POSTPROCEDURE EVALUATION
Patient: Ileana Perez  CORONARY ARTERY BYPASS X3, LEFT ENDOSCOPIC VEIN HARVEST, INTERNAL MAMMARY,  ARTERY ANESTHESIA TRANSESOPHAGEAL ECHOCARDIOGRAM, EPIAORTIC ULTRASOUND  Anesthesia type: general    Patient location: ICU  Last vitals:   Vitals:    01/24/19 2215   BP:    Pulse: 78   Resp:    Temp:    SpO2: 97%     Post vital signs: stable  Level of consciousness: awake, alert and responds to simple questions  Post-anesthesia pain: pain controlled  Post-anesthesia nausea and vomiting: no  Pulmonary: unassisted, nasal cannula  Cardiovascular: stable and blood pressure at baseline  Hydration: adequate  Anesthetic events: no    QCDR Measures:  ASA# 11 - Shaniqua-op Cardiac Arrest: ASA11B - Patient did NOT experience unanticipated cardiac arrest  ASA# 12 - Shaniqua-op Mortality Rate: ASA12B - Patient did NOT die  ASA# 13 - PACU Re-Intubation Rate: ASA13B - Patient did NOT require a new airway mgmt  ASA# 10 - Composite Anes Safety: ASA10A - No serious adverse event    Additional Notes:

## 2021-06-23 NOTE — ANESTHESIA PROCEDURE NOTES
OSEAS    Patient location during procedure: OR  Start time: 1/24/2019 8:11 AM  Staffing:  Performing  Anesthesiologist: Karrie Aponte MD  OSEAS:  Type/Reason: Diagnostic OSEAS probe placement only  Technique: blind insertion  Difficulty: easy

## 2021-06-23 NOTE — PATIENT INSTRUCTIONS - HE
- Take some maalox when these attacks happen    - Use a nitroglycerin under your tongue if the pain lasts more than 5 minutes. You can use a second nitroglycerin 5 minutes later, if it is not better then call 911    - Stress test     - Call if things worsen    - See Dr. Vivas in 2 weeks

## 2021-06-23 NOTE — PROGRESS NOTES
Ileana Mosquedas  [address] [address]  [phone] (home)       Pre-procedure education was completed on 1/14/19 (late entry).    Primary cardiologist:  Dr. Walker  Procedure:  Coronary Angiogram Possible PCI  H&P completed by:  Dr. Walker 1/11  Case MD:  Dr. Walker  Admit date and time:  1/22 6:30 am   Ordering MD:  Dr. Walker  Diagnosis:  Chest pain   Anticoagulation: None  CPAP: No  Bypass Grafts: No  Renal Issues: No  Allergies:   Allergies   Allergen Reactions     Sulfa (Sulfonamide Antibiotics) Swelling     Sulfa eye drops resulted in facial swelling     Penicillins Itching     Iodinated Contrast- Oral And Iv Dye Hives and Other (See Comments)     Annotation: hives 10/2012  And flushing     Other Allergy (See Comments)      Contrast Media Ready-Box MISC, 10/19/2012.       Diabetic?: No  Device?: No      Angiogram Teaching    Reason for Visit:    Telephone call to discuss pre-procedure education in preparation for: Coronary Angiogram Possible PCI    Procedure Prep:  Cardiologist note dated: 1/11  EKG results obtained, dated: Am of procedure  Hemogram results obtained: Am of procedure  Basic Metabolic Panel results obtained: Am of procedure  Lipid Profile results obtained: 12/18    Patient Education  Explained indications/risks for diagnostic evaluation, including one or more of the following:  left heart catheterization, right heart catheterization, coronary angiogram, less than 0.1% of acute myocardial infarction and CVA or death or any of the following to the degree that it could threaten life:  allergic reaction, arrhythmia, renal failure, hemorrhage, thrombosis and infection  Explained indications/risks for therapeutic interventions, including one or more of the following: PTCA, artherectomy, stent, intravascular ultrasound, 2% or less risk of MI, less than 1% risk of CVA, emergency heart surgery, death, less than 4 % risk of vascular injury requiring surgical repair or  blood transfusion, 20-30% risk of restonosis with a bare metal stent, less than 10% risk of restonosis with a drug-eluting stent, 0.5-1% chance of stent thrombosis and may need clopidogrel (Plavix) form greater than 1 year.  These risks are in addition to baseline risks associated with a Diagnostic Evaluation.  Patient states understanding of procedure and risks and agrees to proceed.    Pre-procedure instructions  Patient instructed to be NPO after midnight.  Patient instructed to arrange for transportation home following procedure.  Patient instructed to have a responsible adult with them for 24 hours post-procedure.  Post-procedure follow up process.  Conscious sedation discussed.    Pre-procedure medication instructions  Patient instructed on antiplatelet medication.  Continue medications as scheduled, with a small amount of water on the day of the procedure unless indicated.  Patient instructed to take 325 mg of Aspirin am of procedure: Yes  Other medication: instructed to hold MVIs, supplements a.m. of the procedure.    *Order set was entered on this date: 1/22      Patient Active Problem List   Diagnosis     Dyslipidemia, goal LDL below 70     Hypertension     Coronary artery disease due to calcified coronary lesion     Lower Back Pain     Bradycardia by electrocardiogram     Precordial chest pain     History of stroke     Chest wall pain     Abnormal cardiovascular stress test     Unstable angina (H)       No current facility-administered medications for this visit.      No current outpatient medications on file.     Facility-Administered Medications Ordered in Other Visits   Medication Dose Route Frequency Provider Last Rate Last Dose     acetaminophen tablet 500 mg (TYLENOL)  500 mg Oral Q4H PRN Jena Walker MD         [START ON 1/23/2019] aspirin EC tablet 325 mg  325 mg Oral QPM Jena Walker MD         atorvastatin tablet 40 mg (LIPITOR)  40 mg Oral DAILY Jena Walker MD         atropine  injection 0.5 mg  0.5 mg Intravenous Once PRN Jena Walker MD         fentaNYL pf injection 25-50 mcg (SUBLIMAZE)  25-50 mcg Intravenous Once PRN Jena Walker MD         [START ON 2019] isosorbide mononitrate 24 hr tablet 30 mg (IMDUR)  30 mg Oral Daily after lunch Jena Walker MD         lisinopril tablet 2.5 mg (PRINIVIL,ZESTRIL)  2.5 mg Oral QPM Jena Walker MD         midazolam (PF) injection 0.5-1 mg (VERSED)  0.5-1 mg Intravenous Once PRN Jena Walker MD         morphine injection 1-2 mg  1-2 mg Intravenous Q3H PRN Jena Walker MD         naloxone injection 0.2-0.4 mg (NARCAN)  0.2-0.4 mg Intravenous PRN Dianne Mike CNP        Or     naloxone injection 0.2-0.4 mg (NARCAN)  0.2-0.4 mg Intramuscular PRN Dianne Mike CNP         nitroglycerin SL tablet 0.4 mg (NITROSTAT)  0.4 mg Sublingual Q5 Min PRN Jena Walker MD         [START ON 2019] omeprazole capsule 20 mg (PriLOSEC)  20 mg Oral DAILY Jena Walker MD         oxyCODONE immediate release tablet 5-10 mg (ROXICODONE)  5-10 mg Oral Q4H PRN Jena Walker MD           Allergies   Allergen Reactions     Sulfa (Sulfonamide Antibiotics) Swelling     Sulfa eye drops resulted in facial swelling     Penicillins Itching     Iodinated Contrast- Oral And Iv Dye Hives and Other (See Comments)     Annotation: hives 10/2012  And flushing     Other Allergy (See Comments)      Contrast Media Ready-Box MISC, 10/19/2012.         Patient verbalized understanding of the above teaching.    Randi Kumar RN, BSN, PHN  Cardiovascular Nurse Clinician  [facility]  Direct: [phone]  [phone]  Email: [email or web address]

## 2021-06-23 NOTE — PROGRESS NOTES
"Thank you for asking the [facility] Heart Care team to see Ileana Perez in rapid access clinic     Assessment/Plan:   Chest pain - timing consistent with a GI issue but the fact that it can occur with stress and that it radiates up into the jaw is concerning for angina. EKG from Dr. Stewart was stable with mild anterior wave inversions (several EKGs reviewed, back to 2007). We discussed stress testing and direct angiography and she prefers to start with the stress test.     I reviewed how and when to take NTG. I have also asked her to  some maalox on the way home and try that. She will come to the ER if the pain worsens or persists.    She does have an early systolic murmur consistent with mild AS.    Will call with results     Current History:   Ileana Perez is a 83 y.o. with CAD s/p initial PCI of the proximal LAD with a Taxus stent which restenosed in 2012 and was then treated with a Promus COLEEN. She also has hypertension and some chronotropic insufficiency. Ileana follows with Dr. Aramis Vivas but he is out of the office this week. She presented to her PCP about a month ago with increased indigestion with pain developing in the epigastrum shortly after eating. Her omeprazole was increased to two times a day dosing. Over the past few weeks the pain has started spreading upward in her chest and to her jaws. It was particularly bad after eating some chicken last night and was associated with a series of burps. Occasionally the pain will occur around 4am but is no where near as intense. Ileana is relatively sedentary but does go down and then up a sight incline to get her mail. This will occasionally make her short of breath, but this is a chronic issue. She denies any pain with exertion. There is no pnd, orthopnea or edema. The pain can also occur when having a \"discussion\" with her .    The pain can last up to 15 minutes. She has not used a NTG but has tried some tums which she thinks has helped. " Ileana notes that when she first got a stent her symptom was burning. The second time around she denies chest pain but recalls Dr. Vivas giving her a NTG in clinic and then admitting her.     Past Medical History:     Past Medical History:   Diagnosis Date     CAD (coronary artery disease)      Chronic lower back pain      High cholesterol      Hyperlipidemia      Hypertension      Stroke syndrome        Past Surgical History:     Past Surgical History:   Procedure Laterality Date     CARDIAC CATHETERIZATION       CORONARY STENT PLACEMENT  2012    LAD x 3       Family History:     Family History   Problem Relation Age of Onset     Heart attack Mother      Cancer Daughter        Social History:    reports that  has never smoked. she has never used smokeless tobacco. She reports that she does not drink alcohol or use drugs.    Meds:     Current Outpatient Medications   Medication Sig Note     aspirin 325 MG EC tablet Take 325 mg by mouth every evening.      biotin 10,000 mcg cap Take 1 capsule by mouth daily.      lisinopril (PRINIVIL,ZESTRIL) 10 MG tablet Take 10 mg by mouth every evening.      lisinopril (PRINIVIL,ZESTRIL) 2.5 MG tablet Take 1 tablet by mouth daily. 6/5/2018: Received from: External Pharmacy     metoprolol succinate (TOPROL-XL) 25 MG Take 1 tablet (25 mg total) by mouth daily.      omeprazole (PRILOSEC) 20 MG capsule 1 tab po bid x 14d, then can decrease to once daily if tolerated (Patient taking differently: Take 20 mg by mouth daily as needed. )      pravastatin (PRAVACHOL) 80 MG tablet Take 80 mg by mouth bedtime.       nitroglycerin (NITROSTAT) 0.4 MG SL tablet Place 0.4 mg under the tongue every 5 (five) minutes as needed for chest pain.        Allergies:   Sulfa (sulfonamide antibiotics); Penicillins; Iodinated contrast- oral and iv dye; and Other allergy (see comments)    Review of Systems:   Review of Systems:   General: WNL  Eyes: WNL  Ears/Nose/Throat: WNL  Lungs: WNL  Heart: Chest Pain,  "Shortness of Breath with activity  Stomach: Heartburn  Bladder: WNL  Muscle/Joints: WNL  Skin: WNL  Nervous System: WNL  Mental Health: WNL     Blood: WNL       Objective:      Physical Exam  @LASTENCWT:3@  5' 2\" (1.575 m)  @BMI:3@  /58 (Patient Site: Left Arm, Patient Position: Sitting, Cuff Size: Adult Regular)   Pulse 66   Resp 18   Ht 5' 2\" (1.575 m)   Wt 155 lb (70.3 kg)   BMI 28.35 kg/m      General Appearance:   Alert, cooperative and in no acute distress.   HEENT:  No scleral icterus; the mucous membranes were pink and moist.   Neck: JVP flat. No thyromegaly. No HJR   Chest: The spine was straight. The chest was symmetric.   Lungs:   Respirations unlabored; the lungs are clear to auscultation.   Cardiovascular:   S1 and S2 normal and with 1/6 early systolic murmur. No clicks or rubs. No carotid bruits noted. Right DP, PT, and radial pulses 2+. Left DP, PT, and radial pulses 2+.   Abdomen:  No organomegaly, masses, bruits, or tenderness. Bowels sounds are present   Extremities: No cyanosis, clubbing, or edema.   Skin: No xanthelasma.   Neurologic: Mood and affect are appropriate.         Lab Review   Lab Results   Component Value Date     12/18/2018     11/02/2017     11/03/2016    K 4.5 12/18/2018    K 4.3 11/02/2017    K 5.3 (H) 11/03/2016     (H) 12/18/2018     (H) 11/02/2017     11/03/2016    CO2 23 12/18/2018    CO2 20 (L) 11/02/2017    CO2 25 11/03/2016    BUN 14 12/18/2018    BUN 17 11/02/2017    BUN 19 11/03/2016    CREATININE 0.84 12/18/2018    CREATININE 0.90 11/02/2017    CREATININE 1.08 11/03/2016    CALCIUM 9.8 12/18/2018    CALCIUM 9.5 11/02/2017    CALCIUM 9.9 11/03/2016     Lab Results   Component Value Date    WBC 6.2 05/01/2016    WBC 7.4 03/12/2015    WBC 7.0 10/21/2012    HGB 14.2 05/01/2016    HGB 13.8 03/12/2015    HGB 13.6 10/21/2012    HCT 43.9 05/01/2016    HCT 41.2 03/12/2015    HCT 40.5 10/21/2012    MCV 90 05/01/2016    MCV 87 " 03/12/2015    MCV 88 10/21/2012     05/01/2016     03/12/2015     10/21/2012     Lab Results   Component Value Date    CHOL 139 12/18/2018    CHOL 138 11/02/2017    CHOL 153 11/03/2016    TRIG 115 12/18/2018    TRIG 149 11/02/2017    TRIG 165 (H) 11/03/2016    HDL 42 (L) 12/18/2018    HDL 38 (L) 11/02/2017    HDL 38 (L) 11/03/2016     No results found for: BNP      Jena Walker M.D.

## 2021-06-23 NOTE — TELEPHONE ENCOUNTER
"[facility]  Infectious Disease Progress Note          Assessment and Plan:     ASSESSMENT:  1. 56 yo female acute SEPTIC SHOCK-resolved,  BC negative, UC  With  P.aeruginosa,  Onset after  L ureteroscopy,  Doing well,  JULIANNE  Resolved, off pressors  2. UTI,  UROSEPSIS-Pseudomonas aeruginosa,   SUSY pansens  3. JULIANNE-Resolved  4.  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 H/O T CELL  LYMPHOMA     REC  1.  Continue cipro , po OK  at 500 bid , plan 2 weeks total ABX, day 7/14 today  2 Would normally ignore VRE UC but with uriary manipulation give a bit of dapto(linezolid drug interactions so avoid),continue while here any way, but stop at disposition  3 Discussed VRE/iso issues in detail, MRSA + nares, no tx already iso        Interval History:   no new complaints feels better out of ICU; no cp, sob, n/v/d, or abd pain. Chronic R leg pain, no other pain site  cxs as above  cx also low ct VRE nares MRSA              Medications:       amitriptyline  100 mg Oral At Bedtime     atorvastatin  10 mg Oral Daily     baclofen  10 mg Oral TID     ciprofloxacin  500 mg Oral Q12H JOSE     DAPTOmycin (CUBICIN) intermittent infusion  4 mg/kg Intravenous Q24H     furosemide  40 mg Oral Daily     gabapentin  600 mg Oral TID     heparin  7,500 Units Subcutaneous Q12H     losartan  50 mg Oral Daily     metoprolol tartrate  25 mg Oral BID     polyethylene glycol  17 g Oral Daily     potassium chloride ER  20 mEq Oral Daily     senna-docusate  3 tablet Oral BID     sodium chloride (PF)  10 mL Intracatheter Q7 Days     vitamin D3  1,000 Units Oral Daily                  Physical Exam:   Blood pressure 123/55, pulse 92, temperature 98.2  F (36.8  C), temperature source Oral, resp. rate 16, height 1.702 m (5' 7\"), weight 123.3 kg (271 lb 12.8 oz), last menstrual period 12/11/2011, SpO2 93 %, not currently breastfeeding.  Wt Readings from Last 2 Encounters:   03/19/19 123.3 kg (271 lb 12.8 oz) " Called pt. Test results and reason for procedure and Imdur (was sent to pharmacy by PCP on 1/10). Pt started taking Imdur last Saturday. Purpose, benefits, schedule and possible side effects of the med reviewed with pt. Patient verbalized understanding of the above teaching.     03/15/19 112.9 kg (249 lb)     Vital Signs with Ranges  Temp:  [97  F (36.1  C)-98.6  F (37  C)] 98.2  F (36.8  C)  Heart Rate:  [83-93] 90  Resp:  [16-18] 16  BP: (119-135)/(49-55) 123/55  SpO2:  [93 %-96 %] 93 %    Constitutional: Awake, alert, cooperative, no apparent distress cognition Ok   Lungs: Clear to auscultation bilaterally, no crackles or wheezing   Cardiovascular: Regular rate and rhythm, normal S1 and S2, and no murmur noted   Abdomen: Normal bowel sounds, soft, non-distended, non-tender   Skin: No rashes, no cyanosis, no edema   Other:           Data:   All microbiology laboratory data reviewed.  Recent Labs   Lab Test 03/22/19  0615 03/21/19  0540 03/20/19  0545   WBC 17.0* 14.0* 11.9*   HGB 9.4* 9.4* 9.0*   HCT 30.4* 30.4* 29.3*   MCV 85 85 86    267 248     Recent Labs   Lab Test 03/22/19  0615 03/21/19  0540 03/20/19  0545   CR 0.63 0.65 0.64     Recent Labs   Lab Test 11/01/17  1623   SED 39*     Recent Labs   Lab Test 03/16/19  1550 03/16/19  1455 03/16/19  1222 03/16/19  1200 02/04/19  2130 01/31/19  0410 01/30/19  2120 01/30/19  2016 01/30/19  1522   CULT No growth Methicillin resistant Staphylococcus aureus (MRSA) isolated  This isolate DOES NOT demonstrate inducible clindamycin resistance in vitro. Clindamycin   is susceptible and could be used when indicated, however, erythromycin is resistant and   should not be used.  *  Olive Donald RN 5th floor notified of MRSA 3/20/19 0800 dg 50,000 to 100,000 colonies/mL  Pseudomonas aeruginosa  *  <10,000 colonies/mL  Enterococcus faecium (VRE)  *  Critical Value/Significant Value, preliminary result only, called to and read back by  Merline Bryan RN at [facility] MS5 at 2:30pm 3/18/2019 ()   No growth Cultured on the 2nd day of incubation:  Staphylococcus epidermidis  *  Critical Value/Significant Value, preliminary result only, called to and read back by  Claudia Marsh, FREDO 7550 2/6/19. MS    (Note)  POSITIVE for STAPHYLOCOCCUS  EPIDERMIDIS and POSITIVE for the mecA  gene (resistant to methicillin) by Verigene multiplex nucleic acid  test. Final identification and antimicrobial susceptibility testing  will be verified by standard methods.      Specimen tested with Verigene multiplex, gram-positive blood culture  nucleic acid test for the following targets: Staph aureus, Staph  epidermidis, Staph lugdunensis, other Staph species, Enterococcus  faecalis, Enterococcus faecium, Streptococcus species, S. agalactiae,  S. anginosus grp., S. pneumoniae, S. pyogenes, Listeria sp., mecA  (methicillin resistance) and Randolph/B (vancomycin resistance).      Critical Value/Significant Value called to and read back by FREDO ESPINO 02.06.2019 AT 7.43AM,ZG    No growth Moderate growth  Normal geovanna   Light growth  Escherichia coli  *  Moderate growth  beta hemolytic   Streptococcus constellatus  *  Moderate growth  Normal skin geovanna    Moderate growth  Normal skin geovanna    Moderate growth  beta hemolytic   Streptococcus constellatus  Susceptibility testing done on previous specimen  * 50,000 to 100,000 colonies/mL  Lactose fermenting gram negative rods  *  10,000 to 50,000 colonies/mL  Strain 2  Lactose fermenting gram negative rods  *  <10,000 colonies/mL  Strain 3  Lactose fermenting gram negative rods  *  <10,000 colonies/mL  Strain 4  Lactose fermenting gram negative rods  *  Susceptibility testing not routinely done Cultured on the 1st day of incubation:  Escherichia coli  Susceptibility testing done on previous specimen  *  Critical Value/Significant Value, preliminary result only, called to and read back by  Beronica Woods  RN @0732 01/31/19 gd

## 2021-06-23 NOTE — ANESTHESIA CARE TRANSFER NOTE
Last vitals:   Vitals:    01/24/19 1204   BP: 101/42   Pulse: 61   Resp: 16   Temp: 36.1  C (97  F)   SpO2: 100%     Patient's level of consciousness is unresponsive  Spontaneous respirations: no: vent  Maintains airway independently: no: ett  Dentition unchanged: yes  Oropharynx: endotracheal tube in place    QCDR Measures:  ASA# 20 - Surgical Safety Checklist: WHO surgical safety checklist completed prior to induction    PQRS# 430 - Adult PONV Prevention: NA - Not adult patient, not GA or 3 or more risk factors NOT present  ASA# 8 - Peds PONV Prevention: NA - Not pediatric patient, not GA or 2 or more risk factors NOT present  PQRS# 424 - Shaniqua-op Temp Management: 4559F - At least one body temp DOCUMENTED => 35.5C or 95.9F within required timeframe  PQRS# 426 - PACU Transfer Protocol:NA - Patient did not go to PACU  ASA# 14 - Acute Post-op Pain: ASA14B - Patient did NOT experience pain >= 7 out of 10     Pt transferred to [facility] fully monitored, O2 via ambu, VSS throughout transport

## 2021-06-23 NOTE — ANESTHESIA PROCEDURE NOTES
Arterial Line  Reason for Procedure: hemodynamic monitoring and multiple ABGs  Patient location during procedure: OR pre-induction  Start time: 1/24/2019 7:19 AM  End time: 1/24/2019 7:30 AM  Staffing:  Performing  Anesthesiologist: Karrie Aponte MD  Performing CRNA: Melanie Crane CRNA  Sterile Precautions:  sterile barriers used during insertion: cap, mask, sterile gloves, large sheet, and hand hygiene used.  Arterial Line:   Immediately prior to procedure a time out was called to verify the correct patient, procedure, equipment, support staff and site/side marked as required  Laterality: right  Location: brachial  Prepped with: ChloroPrep    Needle gauge: 20 G  Number of Attempts: 1  Secured with: tape, transparent dressing and pressure dressing  Flushed with: saline  1% lidocaine local anesthesia used for skin prep.   See MAR for additional medications given.

## 2021-06-23 NOTE — ANESTHESIA PROCEDURE NOTES
Central line    Start time: 1/24/2019 7:46 AM  End time: 1/24/2019 7:58 AM  Patient location: OR Post-induction  Indications: central pressure monitoring and vascular access  Performing Anesthesiologist: Karrie Aponte MD  Pre-procedure Checklist  Completed: patient identified, site marked, risks, benefits, and alternatives discussed, timeout performed, consent obtained, hand hygiene performed, all elements of maximal sterile barriers used including cap, mask, gown, sterile gloves, and large sheet and skin prep agent completely dried prior to procedure    Procedure Details:  Preparation: 2% chlorhexidine  Location details: right internal jugular  Catheter type: Introducer with [place]  Introducer type: Cordis  Lumens:quad lumenNumber of attempts: 1  Ultrasound evaluation of access site: yes  Vessel patent by US exam  Concurrent real time visualization of needle entry  Post-procedure:   line sutured and Antimicrobial disks with CHG applied  Assessment: blood return through all ports and free fluid flow  Complications: none

## 2021-06-23 NOTE — TELEPHONE ENCOUNTER
----- Message from Anitha Vivas sent at 1/14/2019  9:38 AM CST -----  Regarding: [facility] ORDERING  [facility] SEO PCI W/[facility] ON TUES 01/22/19  ADMIT AT 630AM   H&P 01/11    ##PT REQUESTING A CALL BACK-PREFERABLY FROM [facility]-TO DISCUSS STRESS TEST RESULTS IN DETAIL.  SHE SAYS SHE DIDN'T QUITE UNDERSTAND WHAT WAS RELAYED TO HER LAST WEEK.    THANKS!  ANITHA

## 2021-06-24 NOTE — PATIENT INSTRUCTIONS - HE
1. We will check an ultrasound of your leg to make sure there is not a blood clot there.  2. Cardiac rehab may be helpful in allowing you to return to full activities with confidence  3. Switch pravastatin to atorvastatin 80 mg daily

## 2021-06-24 NOTE — PROGRESS NOTES
"Ileana Perez  1935  [id number]    Reason for visit: Ileana Perez is 83 y.o. year old female seen in clinic for routine follow-up after cardiac surgery. Hospital course was notable for some renal insufficiency. Patient was discharged to TCU and now is home with Homecare.      Procedure CABG x 3  DOS 1/25/19  Day of discharge: 1/30/19  Discharge to TCU    Readmissions: none    Followed up with Zac Stewart MD on   Follow up with Cardiology scheduled for   Cardiac Rehab start date : to be discussed as to if and when she wants to do it after she has finished with home care.  We discussed cardiac rehab and she will consider going, she was told by someone in OT that she didn't need it. I encouraged her to consider it as it would be helpful for her continued recovery and strength building.     Assessment  Exam  /50 (Patient Site: Left Arm, Patient Position: Sitting, Cuff Size: Adult Regular)   Pulse 60   Temp 97.4  F (36.3  C) (Oral)   Resp 16   Ht 5' 2\" (1.575 m)   Wt 151 lb 12.8 oz (68.9 kg)   BMI 27.76 kg/m       Gen: Alert, oriented, pleasant, no acute distress  CV: RRR without murmur or rub, no appreciable edema  Lungs: CTAB, non-labored breathing on room air  GI: Abdomen soft, non-tender, non-distended  Sternum is stable no movement  Incisions: Chest and leg incisions well healing; C/D/I without erythema, purulence, swelling    Appetite: good  Bowels: regular  Weight: baseline    Plan    Ileana Perez is a 83 y.o. year old female status post CABG who returns to clinic for post-op visit.     Reviewed with patient:  Vital signs /50 (Patient Site: Left Arm, Patient Position: Sitting, Cuff Size: Adult Regular)   Pulse 60   Temp 97.4  F (36.3  C) (Oral)   Resp 16   Ht 5' 2\" (1.575 m)   Wt 151 lb 12.8 oz (68.9 kg)   BMI 27.76 kg/m      Medications : metoprolol 12.5 mg - HR 60   Will continue at 12.5 mg dose for now.    1. Surgically doing well. Incisions are healing well with no signs " of infection. Sternum is stable.  2. Vital signs are stable. .  3. Follow-up with cardiology as scheduled on March 11 with Dr. Degroot  4. Continue cardiac rehab until completed.  5. Continue sternal precautions 4/25/19  6. May start driving 2/25/19  7. Return to work :retired  8. No need for further follow-up with CV surgery unless concerns.      Feel free to call our office with questions. [phone]

## 2021-06-24 NOTE — PROGRESS NOTES
[facility] Office Note    Assessment / Plan:    1.  Coronary artery disease, status post bypass revascularization.  Throat tightness has not returned since her surgery.  Encouraged to enroll in phase 2 cardiac rehab  2.  Left lower extremity swelling.  Will check ultrasound to exclude deep venous thrombosis.  3.  Mixed hyperlipidemia.  We will switch pravastatin to atorvastatin for more potent lipid reduction      Follow-up one year  ______________________________________________________________________    Subjective:    I had the opportunity to see Ileana Perez at the [facility]. Ileana Perez is a 83 y.o. female with a history of coronary artery disease, whom I last saw 5 years ago.  She recently presented with chest discomfort radiating into the jaw and underwent stress testing then angiography that revealed high-grade stenosis in the proximal left anterior descending before the stented segments.  She subsequently underwent three-vessel bypass revascularization in January.  Postoperative course was complicated only by mild renal insufficiency which was transient.  She returns today for routine follow-up.    Since her hospital discharge she has had continued chest wall soreness.  She has been fearful to resume exercise.  She has not attended phase 2 cardiac rehab.  She is back to cooking but not cleaning because of chest wall soreness.  She has a great deal of pain in the left thigh at the vein graft harvest site.    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)     Medical History:  Past Medical History:   Diagnosis Date     CAD (coronary artery disease)      Chronic lower back pain      Gastro-esophageal reflux disease with esophagitis 1/30/2019     High cholesterol      Hyperlipidemia      Hypertension      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) 1/30/2019     Murmur      Stroke syndrome     no residuals     Surgical History:  Past Surgical History:   Procedure Laterality Date     CARDIAC CATHETERIZATION      proximal LAD stents that restenosed treated with another stent     CORONARY STENT PLACEMENT  2012    LAD x 3     CV CORONARY ANGIOGRAM N/A 1/22/2019    Procedure: Coronary Angiogram;  Surgeon: Jena Walker MD;  Location: [facility];  Service: Cardiology     CV LEFT HEART CATHETERIZATION WITH LEFT VENTRICULOGRAM N/A 1/22/2019    Procedure: Left Heart Catheterization with Left Ventriculogram;  Surgeon: Jena Walker MD;  Location: [facility] Lab;  Service: Cardiology     Social History:  Social History     Socioeconomic History     Marital status:      Spouse name: Not on file     Number of children: Not on file     Years of education: Not on file     Highest education level: Not on file   Occupational History     Not on file   Social Needs     Financial resource strain: Not on file     Food insecurity:     Worry: Not on file     Inability: Not on file     Transportation needs:     Medical: Not on file     Non-medical: Not on file   Tobacco Use     Smoking status: Never Smoker     Smokeless tobacco: Never Used   Substance and Sexual Activity     Alcohol use: Yes     Comment: occasionally     Drug use: No     Sexual activity: Not on file   Lifestyle     Physical activity:     Days per week: Not on file     Minutes per session: Not on file     Stress: Not on file   Relationships     Social connections:      Talks on phone: Not on file     Gets together: Not on file     Attends [employer] service: Not on file     Active member of club or organization: Not on file     Attends meetings of clubs or organizations: Not on file     Relationship status: Not on file     Intimate partner violence:     Fear of current or ex partner: Not on file     Emotionally abused: Not on file     Physically abused: Not on file     Forced sexual activity: Not on file   Other Topics Concern     Not on file   Social History Narrative     Not on file     Sleep History:  Denies daytime sleepiness,  reports that she sleeps irregularly but no snoring or apnea is reported  Exercise History:  Doing some household activities, but fearful of exercise.  Attending cardiac rehab    Review of Systems:   General: WNL  Eyes: WNL  Ears/Nose/Throat: WNL  Lungs: WNL  Heart: WNL  Stomach: WNL  Bladder: WNL  Muscle/Joints: WNL  Skin: WNL  Nervous System: WNL  Mental Health: WNL     Blood: WNL          Family History:  Family History   Problem Relation Age of Onset     Heart attack Mother      Cancer Daughter          Allergies:  Allergies   Allergen Reactions     Sulfa (Sulfonamide Antibiotics) Swelling     Sulfa eye drops resulted in facial swelling     Penicillins Itching     Blood-Group Specific Substance      Patient has an Anti-Jka antibody. Expect delay if blood is needed for transfusion     Iodinated Contrast- Oral And Iv Dye Hives and Other (See Comments)     Annotation: hives 10/2012  And flushing     Other Allergy (See Comments)      Contrast Media Ready-Box MISC, 10/19/2012.       Penicillin Unknown     Medications:  Current Outpatient Medications   Medication Sig Dispense Refill     acetaminophen (TYLENOL) 325 MG tablet Take 2 tablets (650 mg total) by mouth every 4 (four) hours as needed.  0     aspirin 325 MG EC tablet Take 325 mg by mouth every evening.       docusate sodium (COLACE) 100 MG capsule Take 1 capsule (100 mg total) by mouth 2  "(two) times a day as needed for constipation.  0     furosemide (LASIX) 20 MG tablet Take 1 tablet (20 mg total) by mouth 2 (two) times a day. For 2 weeks 28 tablet 0     lisinopril (PRINIVIL,ZESTRIL) 2.5 MG tablet Take 2.5 mg by mouth every evening.       metoprolol tartrate (LOPRESSOR) 25 MG tablet Take 37.5 mg by mouth 2 (two) times a day.       metoprolol tartrate 37.5 mg Tab Take 37.5 mg by mouth 2 (two) times a day. (Patient taking differently: Take 12.5 mg by mouth 2 (two) times a day.       ) 30 tablet 2     nitroglycerin (NITROSTAT) 0.4 MG SL tablet Place 0.4 mg under the tongue every 5 (five) minutes as needed for chest pain.       omeprazole (PRILOSEC) 20 MG capsule 1 tab po bid x 14d, then can decrease to once daily if tolerated (Patient taking differently: Take 20 mg by mouth daily as needed. ) 45 capsule 0     potassium chloride (K-DUR,KLOR-CON) 20 MEQ tablet Take 1 tablet (20 mEq total) by mouth 2 (two) times a day. For 2 weeks 28 tablet 0     pravastatin (PRAVACHOL) 80 MG tablet Take 80 mg by mouth bedtime.        biotin 10,000 mcg cap Take 1 capsule by mouth daily.       No current facility-administered medications for this visit.        Objective:   Wt Readings from Last 3 Encounters:   03/11/19 153 lb (69.4 kg)   02/19/19 151 lb 12.8 oz (68.9 kg)   01/29/19 167 lb 12.8 oz (76.1 kg)     Vital signs:  /70 (Patient Site: Left Arm, Patient Position: Sitting, Cuff Size: Adult Regular)   Pulse 68   Resp 16   Ht 5' 2\" (1.575 m)   Wt 153 lb (69.4 kg)   BMI 27.98 kg/m        Physical Exam:    GENERAL APPEARANCE: Alert, cooperative and in no acute distress.  HEENT: Conjunctivae not injected.  No scleral icterus. No Xanthelasma. Oral mucous membranes pink and moist.  NECK: No JVD.  No Hepatojugular reflux. Thyroid not enlarged.  CHEST: clear to auscultation.  Healed midline sternal incision without instability  CARDIOVASCULAR: Regular S1, S2 without murmur ,clicks or rubs. Brachial, radial and " posterior tibial pulses are intact and symmetric. No carotid bruits noted.  ABDOMEN: Nontender. BS+. No bruits.  EXTREMITIES: Woody induration with ecchymosis in the left medial thigh.  No distal edema  SKIN:  No rash, bruising    Lab Results:  LIPIDS:  Lab Results   Component Value Date    CHOL 139 12/18/2018    CHOL 138 11/02/2017    CHOL 153 11/03/2016     Lab Results   Component Value Date    HDL 42 (L) 12/18/2018    HDL 38 (L) 11/02/2017    HDL 38 (L) 11/03/2016     Lab Results   Component Value Date    LDLCALC 74 12/18/2018    LDLCALC 70 11/02/2017    LDLCALC 82 11/03/2016     Lab Results   Component Value Date    TRIG 115 12/18/2018    TRIG 149 11/02/2017    TRIG 165 (H) 11/03/2016     Echocardiogram 1/2019:    1.Left ventricle ejection fraction is normal. The calculated left ventricular ejection fraction is 60%.    2.TAPSE is normal, which is consistent with normal right ventricular systolic function.    3.Ascending aorta upper limits of normal in size.    4.No hemodynamically significant valvular heart abnormalities.    5.When compared to the previous study dated 10/12/2012, no significant change.      Coronary artery bypass 1/2019:  left internal mammary  artery to left anterior descending coronary artery and separate reversed saphenous  vein grafts to the obtuse marginal 1 and distal right coronary arteries.            LILLIE HAQ MD [facility]  [phone]    This note created using Dragon voice recognition software.  Sound alike errors may have escaped editing.

## 2021-06-24 NOTE — PATIENT INSTRUCTIONS - HE
"Ileana Perez is a 83 y.o. year old female status post CABG who returns to clinic for post-op visit.     Reviewed with patient:  Vital signs /50 (Patient Site: Left Arm, Patient Position: Sitting, Cuff Size: Adult Regular)   Pulse 60   Temp 97.4  F (36.3  C) (Oral)   Resp 16   Ht 5' 2\" (1.575 m)   Wt 151 lb 12.8 oz (68.9 kg)   BMI 27.76 kg/m      Medications : metoprolol 12.5 mg - HR 60   Will continue at 12.5 mg dose for now.    1. Surgically doing well. Incisions are healing well with no signs of infection. Sternum is stable.  2. Vital signs are stable. .  3. Follow-up with cardiology as scheduled on March 11 with Dr. Degroot  4. Continue cardiac rehab until completed.  5. Continue sternal precautions 4/25/19  6. May start driving 2/25/19  7. Return to work :retired  8. No need for further follow-up with CV surgery unless concerns.      Feel free to call our office with questions. [phone]            "

## 2021-06-25 NOTE — PROGRESS NOTES
Progress Notes by Aramis Vivas MD at 1/11/2017  2:10 PM     Author: Aramis Vivas MD Service: -- Author Type: Physician    Filed: 1/11/2017  3:04 PM Encounter Date: 1/11/2017 Status: Signed    : Aramis Vivas MD (Physician)           Click to link to [facility] HEART CARE NOTE    Thank you, Dr. Stewart, for asking us to see Ileana Perez at the [facility].      Assessment/Recommendations   Patient with intermittent lightheaded episodes.  She did have an episode of this when I laid her down on the exam table and she put her head back onto the exam table.  This is at about a 30  angle.  She did say that this mimicked her symptoms of lightheadedness.  This finding suggests that it is more likely an inner ear issue but I certainly cannot exclude bradycardia and orthostatic type symptoms.  I checked her blood pressure after she got up from the exam table and her systolic pressure was 140.    She also has increased burping as of late and has not been evaluated for myocardial ischemia and some time.    I have recommended that she decrease her metoprolol to 25 mg of long-acting metoprolol each day.  I have sent a prescription for this.    I have recommended that we do a Holter monitor to look for significant bradycardia or pauses.    I have recommended a exercise test to evaluate for myocardial ischemia giving the belching.    I've also talked to her about a walking program in reducing carbohydrates and increasing protein in her diet.    Thank you for allowing us to participate in her care.         History of Present Illness    Ms. Ileana Perez is a 81 y.o. female with known coronary artery disease and a distant history of cerebrovascular accident.  In the last 2 weeks she's had more difficulty with a lightheaded feeling.  She does admit that it feels like a spinning sensation in her head but also feels like she may go down although she's never come close  to passing out.  It seems to be worse when she gets out of bed in the morning.  At other times when she changes position or even moves her head quickly she will notice the sensation.  She does not note the room spinning but feels like it spinning inside of her head.  She does not get nauseated and has not vomited.  She does not get a clammy feeling.  She does not get short of breath or chest discomfort.  This is been coming out only in the last couple weeks and not before that.    She has had stents placed in her LAD and a stent placed in between the 2 stents in her LAD after the initial episode.  She had her last coronary angiogram in 2012.  Prior to her stent she had belching and may have had some indigestion but she recalls the major thing was burping.  She has had more burping of late as well but denies any shortness of breath or chest discomfort.  She admits to being sedentary.  She does not go for walks, does not climb stairs.  She does vacuuming the house and does not have any difficulty with this.    She denies orthopnea, paroxysmal nocturnal dyspnea, peripheral edema, syncope.  She has no history of rheumatic fever, but did have a cerebrovascular accident.    She's been treated for high blood pressure but is never smoked cigarettes.  She is not diabetic.  She believes her mother had a heart attack in her 40s.  Her last LDL cholesterol was 82 on a statin.    She is .  They had 4 children but had a daughter die at age 2 of leukemia.  Her other children do not have heart disease that she is aware of.  Her spouse is with her at the time of our evaluation.  They met at a [employer] and this  will be  for 60 years.  She grew up in [place].    ECG: Personally reviewed.  And dated January 9, 2017.  It shows sinus bradycardia at 48 bpm with mild T-wave changes.  Borderline first-degree AV block.       Physical Examination Review of Systems   Vitals:    01/11/17 1412   BP: 144/64   Pulse: 64    Resp: 16     Body mass index is 29.26 kg/(m^2).  Wt Readings from Last 3 Encounters:   01/11/17 160 lb (72.6 kg)   05/01/16 160 lb (72.6 kg)     General Appearance:   Alert, cooperative and in no acute distress.   ENT/Mouth: Oral mucuos membranes pink and moist .      EYES:  No scleral icterus. No Xanthelasma.    Neck: JVP normal. No Hepatojugular reflux. Thyroid not visualizedly   Chest/Lungs:   Lungs are clear to auscultation, equal chest wall expansion    Cardiovascular:   S1, S2 without murmur ,clicks or rubs. Brachial, radial and posterior tibial pulses are intact and symetric. No carotid bruits noted   Abdomen:  Nontender. BS+. No bruits.      Extremities: No cyanosis, clubbing or edema   Skin: no xanthelasma, warm.    Psych: Appropriate affect.   Neurologic: normal gait, normal  bilateral, no tremors        General: WNL  Eyes: WNL  Ears/Nose/Throat: WNL  Lungs: WNL  Heart: WNL  Stomach: Diarrhea, Heartburn  Bladder: WNL  Muscle/Joints: WNL  Skin: WNL  Nervous System: Dizziness  Mental Health: WNL     Blood: WNL     Medical History  Surgical History Family History Social History   Past Medical History   Diagnosis Date   ? CAD (coronary artery disease)    ? Chronic lower back pain    ? Hyperlipidemia    ? Hypertension    ? Stroke syndrome     Past Surgical History   Procedure Laterality Date   ? Coronary stent placement      History reviewed. No pertinent family history. Social History     Social History   ? Marital status:      Spouse name: N/A   ? Number of children: N/A   ? Years of education: N/A     Occupational History   ? Not on file.     Social History Main Topics   ? Smoking status: Never Smoker   ? Smokeless tobacco: Not on file   ? Alcohol use No   ? Drug use: No   ? Sexual activity: Not on file     Other Topics Concern   ? Not on file     Social History Narrative          Medications  Allergies   Current Outpatient Prescriptions   Medication Sig Dispense Refill   ? aspirin 325 MG EC  tablet Take 325 mg by mouth every evening.     ? lisinopril (PRINIVIL,ZESTRIL) 10 MG tablet Take 10 mg by mouth every evening.     ? nitroglycerin (NITROSTAT) 0.4 MG SL tablet Place 0.4 mg under the tongue every 5 (five) minutes as needed for chest pain.     ? omeprazole (PRILOSEC) 20 MG capsule 1 tab po bid x 14d, then can decrease to once daily if tolerated (Patient taking differently: Take 20 mg by mouth daily as needed. ) 45 capsule 0   ? pravastatin (PRAVACHOL) 80 MG tablet Take 80 mg by mouth bedtime.      ? metoprolol succinate (TOPROL-XL) 25 MG Take 1 tablet (25 mg total) by mouth daily. 30 tablet 3     No current facility-administered medications for this visit.       Allergies   Allergen Reactions   ? Sulfa (Sulfonamide Antibiotics) Swelling     Sulfa eye drops resulted in facial swelling   ? Penicillins Itching   ? Iodinated Contrast Media - Oral And Iv Dye Hives and Other (See Comments)     Annotation: hives 10/2012  And flushing   ? Other Allergy (See Comments)      Contrast Media Ready-Box MISC, 10/19/2012.           Lab Results    Chemistry/lipid CBC Cardiac Enzymes/BNP/TSH/INR   Lab Results   Component Value Date    CHOL 153 11/03/2016    HDL 38 (L) 11/03/2016    LDLCALC 82 11/03/2016    TRIG 165 (H) 11/03/2016    CREATININE 1.08 11/03/2016    BUN 19 11/03/2016    K 5.3 (H) 11/03/2016     11/03/2016     11/03/2016    CO2 25 11/03/2016    Lab Results   Component Value Date    WBC 6.2 05/01/2016    HGB 14.2 05/01/2016    HCT 43.9 05/01/2016    MCV 90 05/01/2016     05/01/2016    Lab Results   Component Value Date    CKMB 2 10/18/2012    TROPONINI 0.02 05/01/2016    INR 0.94 10/21/2012

## 2021-06-25 NOTE — PROGRESS NOTES
2nd order for Phase II Cardiac Rehab received. Spoke to patient and she doesn't feel she needs it, even though Dr. Rangel wants her to. Patient will consider it and call me back if she changes her mind.

## 2021-06-26 NOTE — PROGRESS NOTES
Progress Notes by Aramis Vivas MD at 6/5/2018  8:50 AM     Author: Aramis Vivas MD Service: -- Author Type: Physician    Filed: 6/5/2018  9:26 AM Encounter Date: 6/5/2018 Status: Signed    : Aramis Vivas MD (Physician)           Click to link to [facility] HEART CARE NOTE    Thank you, Dr. Stewart, for asking us to see Ileana Perez at the [facility].      Assessment/Recommendations   Patient with known coronary artery disease who had no evidence for myocardial ischemia on nuclear exercise test last year and is doing well from a functional capacity standpoint.  She is relatively sedentary and I encouraged her to do a 15-20 minute walk each day which she will take under advisement.  Her blood pressures at goal and her LDL cholesterol is at goal and she takes an antiplatelet agent.    I will ask her to see us in 1 year, but of course would be happy to see her sooner if questions or problems arise.    Thank you for allowing us to participate in her care.         History of Present Illness    Ms. Ileana Perez is a 82 y.o. female with known history of coronary artery disease, hypertension who we evaluated last year for some dizziness.  It was felt to be more likely in her ear and a Holter monitor did not show any significant bradycardia and a nuclear stress test did not show evidence of ischemia or infarction.    She has been feeling well this past year.  She admits to being relatively sedentary in part because her back bothered her when she walked in the past.  She denies orthopnea, paroxysmal nocturnal dyspnea, peripheral edema, syncope or near syncopal episodes.  The dizzy episodes that she was having of completely gone away.    Her LDL cholesterol in 2017 was 70.             Physical Examination Review of Systems   Vitals:    06/05/18 0855   BP: 118/58   Pulse: 72   Resp: 16     Body mass index is 29.26 kg/(m^2).  Wt Readings from Last 3  Encounters:   06/05/18 160 lb (72.6 kg)   01/11/17 160 lb (72.6 kg)   05/01/16 160 lb (72.6 kg)     General Appearance:   Alert, cooperative and in no acute distress.   ENT/Mouth: Oral mucuos membranes pink and moist .      EYES:  No scleral icterus. No Xanthelasma.    Neck: JVP normal. No Hepatojugular reflux. Thyroid not visualized   Chest/Lungs:   Lungs are clear to auscultation, equal chest wall expansion    Cardiovascular:   S1, S2 without murmur ,clicks or rubs. Brachial, radial  pulses are intact and symetric. No carotid bruits noted   Abdomen:  Nontender. BS+. No bruits.      Extremities: No cyanosis, clubbing or edema   Skin: no xanthelasma, warm.    Psych: Appropriate affect.   Neurologic: normal gait, normal  bilateral, no tremors        General: WNL  Eyes: WNL  Ears/Nose/Throat: WNL  Lungs: WNL  Heart: WNL  Stomach: Heartburn  Bladder: WNL  Muscle/Joints: WNL  Skin: WNL  Nervous System: WNL  Mental Health: WNL     Blood: WNL     Medical History  Surgical History Family History Social History   Past Medical History:   Diagnosis Date   ? CAD (coronary artery disease)    ? Chronic lower back pain    ? High cholesterol    ? Hyperlipidemia    ? Hypertension    ? Stroke syndrome     Past Surgical History:   Procedure Laterality Date   ? CARDIAC CATHETERIZATION     ? CORONARY STENT PLACEMENT  2012    LAD x 3    Family History   Problem Relation Age of Onset   ? Heart attack Mother    ? Cancer Daughter     Social History     Social History   ? Marital status:      Spouse name: N/A   ? Number of children: N/A   ? Years of education: N/A     Occupational History   ? Not on file.     Social History Main Topics   ? Smoking status: Never Smoker   ? Smokeless tobacco: Never Used   ? Alcohol use No   ? Drug use: No   ? Sexual activity: Not on file     Other Topics Concern   ? Not on file     Social History Narrative          Medications  Allergies   Current Outpatient Prescriptions   Medication Sig Dispense  Refill   ? aspirin 325 MG EC tablet Take 325 mg by mouth every evening.     ? biotin 10,000 mcg cap Take 1 capsule by mouth daily.     ? lisinopril (PRINIVIL,ZESTRIL) 2.5 MG tablet Take 1 tablet by mouth daily.     ? nitroglycerin (NITROSTAT) 0.4 MG SL tablet Place 0.4 mg under the tongue every 5 (five) minutes as needed for chest pain.     ? omeprazole (PRILOSEC) 20 MG capsule 1 tab po bid x 14d, then can decrease to once daily if tolerated (Patient taking differently: Take 20 mg by mouth daily as needed. ) 45 capsule 0   ? pravastatin (PRAVACHOL) 80 MG tablet Take 80 mg by mouth bedtime.      ? lisinopril (PRINIVIL,ZESTRIL) 10 MG tablet Take 10 mg by mouth every evening.     ? metoprolol succinate (TOPROL-XL) 25 MG Take 1 tablet (25 mg total) by mouth daily. 30 tablet 3     No current facility-administered medications for this visit.       Allergies   Allergen Reactions   ? Sulfa (Sulfonamide Antibiotics) Swelling     Sulfa eye drops resulted in facial swelling   ? Penicillins Itching   ? Iodinated Contrast- Oral And Iv Dye Hives and Other (See Comments)     Annotation: hives 10/2012  And flushing   ? Other Allergy (See Comments)      Contrast Media Ready-Box MISC, 10/19/2012.           Lab Results    Chemistry/lipid CBC Cardiac Enzymes/BNP/TSH/INR   Lab Results   Component Value Date    CHOL 138 11/02/2017    HDL 38 (L) 11/02/2017    LDLCALC 70 11/02/2017    TRIG 149 11/02/2017    CREATININE 0.90 11/02/2017    BUN 17 11/02/2017    K 4.3 11/02/2017     11/02/2017     (H) 11/02/2017    CO2 20 (L) 11/02/2017    Lab Results   Component Value Date    WBC 6.2 05/01/2016    HGB 14.2 05/01/2016    HCT 43.9 05/01/2016    MCV 90 05/01/2016     05/01/2016    Lab Results   Component Value Date    CKMB 2 10/18/2012    TROPONINI 0.02 05/01/2016    INR 0.94 10/21/2012

## 2021-06-29 NOTE — PROGRESS NOTES
Progress Notes by Tyrone Tam MD at 5/18/2020  7:50 AM     Author: Tyrone Tam MD Service: -- Author Type: Physician    Filed: 5/18/2020  8:46 AM Encounter Date: 5/18/2020 Status: Signed    : Tyrone Tam MD (Physician)           Rapid Access Clinic Note    Thank you, Zac Reyes MD, for asking the [facility] team to see Ms. Ileana Perez to evaluate Chest Pain .      Assessment/Recommendations   Assessment:    1. Epigastric/chest discomfort - this may represent angina or GERD. Although I suspect that GERD is more likely, it has enough features of angina that it needs to be evaluated.  2. Coronary artery disease with prior CABG  3. Hypertension - BP mildly elevated today. Home cuff reads significantly higher than in clinic. I recommended she obtain a new home cuff.    Plan:  1. Exercise nuclear stress test - pt instructed to hold b-blocker morning of test. Transition to pharmacologic study if unable to obtain target heart rate  2. Continue other medications as prescribed.  3. Will postpone f/u with Dr. Vivas to 6 weeks from now.    Primary Cardiologist: Dr. Aramis Vivas MD         History of Present Illness   Ms. Ileana Perez is a 84 y.o. female with a significant past history of coronary artery disease with prior bypass surgery, hypertension, hyperlipidemia who presents for urgent evaluation of chest pain.    Mrs. Perez has been experiencing some lower sternal or epigastric chest discomfort described as an ache for a little under a month.  This occurs pretty much daily in the morning.  It can be associated with some throat discomfort that also has some phlegm production.  There is no cough.  It is somewhat relieved with belching and the throat discomfort can be improved with coffee.  It is generally not exertional but she did feel some modest improvement after starting isosorbide mononitrate prescribed by her primary physician last week.  Generally the  symptoms last approximately 20 to 30 minutes before resolving.  She does not get regular exercise however is on her feet a lot of the day caring for her  who has at least moderate dementia.  She has not noted an exertional component to her discomfort.      Other than noted above, Ms. Perez denies any chest pain/pressure/tightness, shortness of breath at rest or with exertion, light headedness/dizziness, pre-syncope, syncope, lower extremity swelling, palpitations, paroxysmal nocturnal dyspnea (PND), or orthopnea.     Cardiac Problems and Cardiac Diagnostics     Most Recent Cardiac testing:  ECG dated 5/14/2020 (personaly reviewed and interpreted): sinus rhythm with RBBB. Nonspecific TWI in inferior leads are new compared to prior ECG.    ECHO (report reviewed):   Echo results:   Results for orders placed during the hospital encounter of 01/22/19   Echo Complete [ECH10] 01/22/2019    Narrative   1.Left ventricle ejection fraction is normal. The calculated left   ventricular ejection fraction is 60%.    2.TAPSE is normal, which is consistent with normal right ventricular   systolic function.    3.Ascending aorta upper limits of normal in size.    4.No hemodynamically significant valvular heart abnormalities.    5.When compared to the previous study dated 10/12/2012, no significant   change.             Medications  Allergies   Current Outpatient Medications   Medication Sig Dispense Refill   ? aspirin 325 MG EC tablet Take 325 mg by mouth every evening.     ? isosorbide mononitrate (IMDUR) 30 MG 24 hr tablet Take 30 mg by mouth daily.     ? metoprolol tartrate (LOPRESSOR) 25 MG tablet Take 12.5 mg by mouth daily.      ? nitroglycerin (NITROSTAT) 0.4 MG SL tablet Place 0.4 mg under the tongue every 5 (five) minutes as needed for chest pain.     ? omeprazole (PRILOSEC) 20 MG capsule 1 tab po bid x 14d, then can decrease to once daily if tolerated (Patient taking differently: Take 20 mg by mouth daily as needed.  ) 45 capsule 0   ? pravastatin (PRAVACHOL) 80 MG tablet Take 80 mg by mouth at bedtime.     ? acetaminophen (TYLENOL) 325 MG tablet Take 2 tablets (650 mg total) by mouth every 4 (four) hours as needed.  0   ? atorvastatin (LIPITOR) 80 MG tablet Take 1 tablet (80 mg total) by mouth daily. 90 tablet 3   ? biotin 10,000 mcg cap Take 1 capsule by mouth daily.     ? docusate sodium (COLACE) 100 MG capsule Take 1 capsule (100 mg total) by mouth 2 (two) times a day as needed for constipation.  0   ? furosemide (LASIX) 20 MG tablet Take 1 tablet (20 mg total) by mouth 2 (two) times a day. For 2 weeks 28 tablet 0   ? potassium chloride (K-DUR,KLOR-CON) 20 MEQ tablet Take 1 tablet (20 mEq total) by mouth 2 (two) times a day. For 2 weeks 28 tablet 0     No current facility-administered medications for this visit.       Allergies   Allergen Reactions   ? Sulfa (Sulfonamide Antibiotics) Swelling     Sulfa eye drops resulted in facial swelling   ? Penicillins Itching   ? Blood-Group Specific Substance      Patient has an Anti-Jka antibody. Expect delay if blood is needed for transfusion   ? Iodinated Contrast Media Hives and Other (See Comments)     Annotation: hives 10/2012  And flushing   ? Other Allergy (See Comments)      Contrast Media Ready-Box MISC, 10/19/2012.     ? Penicillin Unknown        Physical Examination Review of Systems   Vitals:    05/18/20 0802   BP: 146/62   Pulse: (!) 52   Resp: 16     Body mass index is 27.98 kg/m .  Wt Readings from Last 3 Encounters:   05/18/20 153 lb (69.4 kg)   03/11/19 153 lb (69.4 kg)   02/19/19 151 lb 12.8 oz (68.9 kg)       General Appearance:   Pleasant female, appears stated age. no acute distress, overweight body habitus   ENT/Mouth: membranes moist, no apparent gingival bleeding.      EYES:  no scleral icterus, normal conjunctivae   Neck: Supple. No gross thyromegaly   Respiratory:   lungs are clear to auscultation, no rales or wheezing, equal chest wall expansion     Cardiovascular:   Regular rhythm, borderline slow rate. Normal first and second heart sounds with no murmurs, rubs, or gallops; the carotid, radial and posterior tibial pulses are intact, Jugular venous pressure normal, no edema bilaterally    Abdomen/GI:  Soft, non-tender. Palpation did not reproduce presenting pain.   Extremities: no cyanosis or clubbing   Skin: no xanthelasma, warm.    Heme/lymph/ Immunology No apparent bleeding noted.   Neurologic: Alert and oriented. normal gait, no tremors   Psychiatric: Pleasant, calm, appropriate affect.    A complete 10 system review of systems was performed and is negative except as mentioned in the HPI or below:  General: WNL  Eyes: WNL  Ears/Nose/Throat: Hearing Loss  Lungs: WNL  Heart: Chest Pain  Stomach: Heartburn  Bladder: WNL  Muscle/Joints: WNL  Skin: WNL  Nervous System: WNL  Mental Health: Anxiety     Blood: WNL       Past History   Past Medical History:   Past Medical History:   Diagnosis Date   ? CAD (coronary artery disease)    ? Chronic lower back pain    ? Gastro-esophageal reflux disease with esophagitis 1/30/2019   ? High cholesterol    ? Hyperlipidemia    ? Hypertension    ? 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 Murmur    ? Stroke syndrome     no residuals       Past Surgical History:   Past Surgical History:   Procedure Laterality Date   ? CARDIAC CATHETERIZATION      proximal LAD stents that restenosed treated with another stent   ? CORONARY ARTERY BYPASS GRAFT  01/2019    LIMA^LAD,SVG^rca, SVG^cxOM1   ? CORONARY STENT PLACEMENT  2012    LAD x 3   ? CV CORONARY ANGIOGRAM N/A 1/22/2019    Procedure: Coronary Angiogram;  Surgeon: Jena Walker MD;  Location: [facility] Cath Lab;  Service: Cardiology   ? CV LEFT HEART CATHETERIZATION WITH LEFT VENTRICULOGRAM N/A 1/22/2019    Procedure: Left Heart Catheterization with Left Ventriculogram;  Surgeon: Jena Walker MD;  Location: [facility] Cath Lab;  Service: Cardiology        Family History:   Family History   Problem Relation Age of Onset   ? Heart attack Mother    ? Cancer Daughter         Social History:   Social History     Socioeconomic History   ? Marital status:      Spouse name: Not on file   ? Number of children: Not on file   ? Years of education: Not on file   ? Highest education level: Not on file   Occupational History   ? Not on file   Social Needs   ? Financial resource strain: Not on file   ? Food insecurity     Worry: Not on file     Inability: Not on file   ? Transportation needs     Medical: Not on file     Non-medical: Not on file   Tobacco Use   ? Smoking status: Never Smoker   ? Smokeless tobacco: Never Used   Substance and Sexual Activity   ? Alcohol use: Yes     Comment: occasionally   ? Drug use: No   ? Sexual activity: Not on file   Lifestyle   ? Physical activity     Days per week: Not on file     Minutes per session: Not on file   ? Stress: Not on file   Relationships   ? Social connections     Talks on phone: Not on file     Gets together: Not on file     Attends Sikhism service: Not on file     Active member of club or organization: Not on file     Attends meetings of clubs or organizations: Not on file     Relationship status: Not on file   ? Intimate partner violence     Fear of current or ex partner: Not on file     Emotionally abused: Not on file     Physically abused: Not on file     Forced sexual activity: Not on file   Other Topics Concern   ? Not on file   Social History Narrative   ? Not on file              Lab Results    Chemistry/lipid CBC Cardiac Enzymes/BNP/TSH/INR   Lab Results   Component Value Date    CHOL 111 12/12/2019    HDL 36 (L) 12/12/2019    LDLCALC 50 12/12/2019    TRIG 126 12/12/2019    CREATININE 0.87 12/12/2019    BUN 13 12/12/2019    K 4.6 12/12/2019     12/12/2019     (H) 12/12/2019    CO2 24 12/12/2019    Lab Results   Component Value Date    WBC 10.9 02/06/2019    HGB 11.1 (L) 02/06/2019    HCT 36.2  02/06/2019    MCV 94 02/06/2019     (H) 02/06/2019    Lab Results   Component Value Date    CKMB 2 10/18/2012    TROPONINI 0.02 05/01/2016    INR 1.31 (H) 01/25/2019

## 2021-06-29 NOTE — PROGRESS NOTES
"Progress Notes by Aramis Vivas MD at 7/2/2020 10:10 AM     Author: Aramis Vivas MD Service: -- Author Type: Physician    Filed: 7/2/2020 10:43 AM Encounter Date: 7/2/2020 Status: Signed    : Aramis Vivas MD (Physician)           The patient has been notified of following:     \"This telephone visit will be conducted via a call between you and your physician/provider. We have found that certain health care needs can be provided without the need for a physical exam.  This service lets us provide the care you need with a phone conversation.  If a prescription is necessary we can send it directly to your pharmacy.  If lab work is needed we can place an order for that and you can then stop by our lab to have the test done at a later time. If during the course of the call the physician/provider feels a telephone visit is not appropriate, you will not be charged for this service.\" Verbal consent has been obtained for this service by care team member:         HEART CARE PHONE ENCOUNTER        The patient has chosen to have the visit conducted as a telephone visit, to reduce risk of exposure given the current status of Coronavirus in our community. This telephone visit is being conducted via a call between the patient and physician/provider. Health care needs are being provided without a physical exam.     Assessment/Recommendations   Assessment:    Patient with known coronary artery disease, status post recent coronary artery bypass surgery.  She also had some symptoms which were somewhat atypical and very recent stress nuclear study was normal.  This is very comforting.  Her symptoms have gone away and she relates them to stress as her  has dementia and requires a lot of cares which wears her down.    She is on a good dose of pravastatin, her blood pressures are good and I have not changed any of her medicines today.  She also takes an antiplatelet agent.    We will plan on seeing her in " follow-up with our nurse practitioner in 1 year and with me in 2 years.    Thank you for allowing us to participate in her care.      I have reviewed the note as documented.  This accurately captures the substance of my conversation with the patient.    Total time of call between patient and provider was 6 minutes   Start Time: 10:32 AM  Stop Time: 10:38 AM       History of Present Illness/Subjective    Ileana Perez is a 84 y.o. female who is being evaluated via a billable telephone visit.  Patient with known coronary artery disease, status post recent coronary artery bypass surgery.  Since that time she had some symptoms which were suggestive of angina but somewhat atypical in nature.  She saw 1 of my partners and a stress nuclear test was ordered which was normal.  Normal ejection fraction, no perfusion abnormalities and no previous scars.  She has been feeling better since that time and relates the discomfort to stress given her significant obligations taking care of her  who has dementia.    She does not have any signs or symptoms of congestive heart failure, no recurrent chest discomforts and is active in her home taking care of her  but does not exercise on a routine basis.  Her LDL cholesterol was 50 in December 2019.      I have reviewed and updated the patient's Past Medical History, Social History, Family History and Medication List.     Physical Examination not performed given phone encounter Review of Systems                                                Medical History  Surgical History Family History Social History   Past Medical History:   Diagnosis Date   ? CAD (coronary artery disease)    ? Chronic lower back pain    ? Gastro-esophageal reflux disease with esophagitis 1/30/2019   ? High cholesterol    ? Hyperlipidemia    ? Hypertension    ? 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 Murmur    ? Stroke syndrome     no residuals    Past Surgical History:   Procedure  Laterality Date   ? CARDIAC CATHETERIZATION      proximal LAD stents that restenosed treated with another stent   ? CORONARY ARTERY BYPASS GRAFT  01/2019    LIMA^LAD,SVG^rca, SVG^cxOM1   ? CORONARY STENT PLACEMENT  2012    LAD x 3   ? CV CORONARY ANGIOGRAM N/A 1/22/2019    Procedure: Coronary Angiogram;  Surgeon: Jena Walker MD;  Location: [facility] Cath Lab;  Service: Cardiology   ? CV LEFT HEART CATHETERIZATION WITH LEFT VENTRICULOGRAM N/A 1/22/2019    Procedure: Left Heart Catheterization with Left Ventriculogram;  Surgeon: Jena Walker MD;  Location: [facility] Cath Lab;  Service: Cardiology    Family History   Problem Relation Age of Onset   ? Heart attack Mother    ? Cancer Daughter     Social History     Socioeconomic History   ? Marital status:      Spouse name: Not on file   ? Number of children: Not on file   ? Years of education: Not on file   ? Highest education level: Not on file   Occupational History   ? Not on file   Social Needs   ? Financial resource strain: Not on file   ? Food insecurity     Worry: Not on file     Inability: Not on file   ? Transportation needs     Medical: Not on file     Non-medical: Not on file   Tobacco Use   ? Smoking status: Never Smoker   ? Smokeless tobacco: Never Used   Substance and Sexual Activity   ? Alcohol use: Yes     Comment: occasionally   ? Drug use: No   ? Sexual activity: Not on file   Lifestyle   ? Physical activity     Days per week: Not on file     Minutes per session: Not on file   ? Stress: Not on file   Relationships   ? Social connections     Talks on phone: Not on file     Gets together: Not on file     Attends Mandaen service: Not on file     Active member of club or organization: Not on file     Attends meetings of clubs or organizations: Not on file     Relationship status: Not on file   ? Intimate partner violence     Fear of current or ex partner: Not on file     Emotionally abused: Not on file     Physically abused: Not on  file     Forced sexual activity: Not on file   Other Topics Concern   ? Not on file   Social History Narrative   ? Not on file          Medications  Allergies   Current Outpatient Medications   Medication Sig Dispense Refill   ? acetaminophen (TYLENOL) 325 MG tablet Take 2 tablets (650 mg total) by mouth every 4 (four) hours as needed.  0   ? aspirin 325 MG EC tablet Take 325 mg by mouth every evening.     ? metoprolol tartrate (LOPRESSOR) 25 MG tablet Take 12.5 mg by mouth daily.      ? nitroglycerin (NITROSTAT) 0.4 MG SL tablet Place 0.4 mg under the tongue every 5 (five) minutes as needed for chest pain.     ? omeprazole (PRILOSEC) 20 MG capsule 1 tab po bid x 14d, then can decrease to once daily if tolerated (Patient taking differently: Take 20 mg by mouth daily as needed. ) 45 capsule 0   ? pravastatin (PRAVACHOL) 80 MG tablet Take 80 mg by mouth at bedtime.       No current facility-administered medications for this visit.     Allergies   Allergen Reactions   ? Sulfa (Sulfonamide Antibiotics) Swelling     Sulfa eye drops resulted in facial swelling   ? Penicillins Itching   ? Blood-Group Specific Substance      Patient has an Anti-Jka antibody. Expect delay if blood is needed for transfusion   ? Iodinated Contrast Media Hives and Other (See Comments)     Annotation: hives 10/2012  And flushing   ? Other Allergy (See Comments)      Contrast Media Ready-Box MISC, 10/19/2012.     ? Penicillin Unknown         Lab Results    Chemistry/lipid CBC Cardiac Enzymes/BNP/TSH/INR   Lab Results   Component Value Date    CHOL 111 12/12/2019    HDL 36 (L) 12/12/2019    LDLCALC 50 12/12/2019    TRIG 126 12/12/2019    CREATININE 0.87 12/12/2019    BUN 13 12/12/2019    K 4.6 12/12/2019     12/12/2019     (H) 12/12/2019    CO2 24 12/12/2019    Lab Results   Component Value Date    WBC 10.9 02/06/2019    HGB 11.1 (L) 02/06/2019    HCT 36.2 02/06/2019    MCV 94 02/06/2019     (H) 02/06/2019    Lab Results    Component Value Date    CKMB 2 10/18/2012    TROPONINI 0.02 05/01/2016    INR 1.31 (H) 01/25/2019        Aramis Vivas

## 2021-06-30 NOTE — PROGRESS NOTES
"Progress Notes by Aramis Vivas MD at 2/19/2021  1:30 PM     Author: Aramis Vivas MD Service: -- Author Type: Physician    Filed: 2/19/2021  2:11 PM Encounter Date: 2/19/2021 Status: Signed    : Aramis Vivas MD (Physician)               Assessment/Recommendations   Patient with known coronary artery disease, status post bypass surgery just over 2 years ago.  She is not having any chest discomfort, her breathing is comfortable although she is relatively sedentary in part because of the pandemic and in part because of the cold weather.  I have encouraged her to be more active and find a way to do some walking, even if it is for short periods of time in her own home.  She will take that under advisement.    Her blood pressure is excellent today but her blood pressure chart does show elevation in her blood pressure.  She has symptoms which I suspect are orthostatic in nature so I am reluctant to increase her antihypertensive medications and put her more at risk for falls.  We discussed this today in the clinic.    Her LDL cholesterol is well controlled on a statin and she takes an antiplatelet agent.    Thank you for allowing us to participate in her care.  We will see her back in 1 year, but of course would be happy to see her sooner if questions or problems arise.       History of Present Illness/Subjective    Ms. Ileana Perez is a 85 y.o. female with known coronary artery disease, status post coronary artery bypass surgery by Dr. Boogie in January 2019.  She has been feeling well and has had no chest discomfort reports that her breathing is quite comfortable.  She has not had any palpitations.  She denies orthopnea or paroxysmal nocturnal dyspnea.  She has had some \"dizziness\".  She got some therapy for this which helped it by 99% but still when she goes from a lying position in bed to sitting up she will feel lightheaded and uses a walker for the first minute or 2.  It is gets better with " time.  She has not passed out with this.  Dr. Stewart decreased and stopped her beta-blocker but her symptoms did not improve and her blood pressure was continued to be on the borderline high side so this was restarted.  Symptoms did not change.  She brings in many blood pressures today several which are excellent but some of them are in the 160 range.    .       Physical Examination Review of Systems   Vitals:    02/19/21 1326   BP: 118/58   Pulse: 76   Resp: 14     Body mass index is 29.45 kg/m .  Wt Readings from Last 3 Encounters:   02/19/21 161 lb (73 kg)   05/18/20 153 lb (69.4 kg)   03/11/19 153 lb (69.4 kg)     General Appearance:   Alert, cooperative and in no acute distress.   ENT/Mouth: Patient wearing a mask.      EYES:  no scleral icterus, normal conjunctivae   Neck: JVP normal. No Hepatojugular reflux. Thyroid not visualized.   Chest/Lungs:   Lungs are clear to auscultation, equal chest wall expansion.   Cardiovascular:   S1, S2 without murmur ,clicks or rubs. Brachial, radial  pulses are intact and symetric. No carotid bruits noted   Abdomen:  Nontender. BS+.   Extremities: No cyanosis, clubbing or edema   Skin: no xanthelasma, warm.    Neurologic: normal arm movement bilateral, no tremors     Psychiatric: Appropriate affect.      General: WNL  Eyes: WNL  Ears/Nose/Throat: WNL  Lungs: WNL  Heart: WNL  Stomach: Heartburn  Bladder: WNL  Muscle/Joints: WNL  Skin: WNL  Nervous System: Dizziness  Mental Health: Depression     Blood: Easy Bruising       Medical History  Surgical History Family History Social History   Past Medical History:   Diagnosis Date   ? CAD (coronary artery disease)    ? Chronic lower back pain    ? Gastro-esophageal reflux disease with esophagitis 1/30/2019   ? High cholesterol    ? Hyperlipidemia    ? Hypertension    ? 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 Murmur    ? Stroke syndrome     no residuals    Past Surgical History:   Procedure Laterality Date    ? CARDIAC CATHETERIZATION      proximal LAD stents that restenosed treated with another stent   ? CORONARY ARTERY BYPASS GRAFT  01/2019    LIMA^LAD,SVG^rca, SVG^cxOM1   ? CORONARY STENT PLACEMENT  2012    LAD x 3   ? CV CORONARY ANGIOGRAM N/A 1/22/2019    Procedure: Coronary Angiogram;  Surgeon: Jena Walker MD;  Location: [facility] Cath Lab;  Service: Cardiology   ? CV LEFT HEART CATHETERIZATION WITH LEFT VENTRICULOGRAM N/A 1/22/2019    Procedure: Left Heart Catheterization with Left Ventriculogram;  Surgeon: Jena Walker MD;  Location: [facility] Cath Lab;  Service: Cardiology    Family History   Problem Relation Age of Onset   ? Heart attack Mother    ? Cancer Daughter     Social History     Socioeconomic History   ? Marital status:      Spouse name: Not on file   ? Number of children: Not on file   ? Years of education: Not on file   ? Highest education level: Not on file   Occupational History   ? Not on file   Social Needs   ? Financial resource strain: Not on file   ? Food insecurity     Worry: Not on file     Inability: Not on file   ? Transportation needs     Medical: Not on file     Non-medical: Not on file   Tobacco Use   ? Smoking status: Never Smoker   ? Smokeless tobacco: Never Used   Substance and Sexual Activity   ? Alcohol use: Yes     Comment: occasionally   ? Drug use: No   ? Sexual activity: Not on file   Lifestyle   ? Physical activity     Days per week: Not on file     Minutes per session: Not on file   ? Stress: Not on file   Relationships   ? Social connections     Talks on phone: Not on file     Gets together: Not on file     Attends Scientologist service: Not on file     Active member of club or organization: Not on file     Attends meetings of clubs or organizations: Not on file     Relationship status: Not on file   ? Intimate partner violence     Fear of current or ex partner: Not on file     Emotionally abused: Not on file     Physically abused: Not on file     Forced  sexual activity: Not on file   Other Topics Concern   ? Not on file   Social History Narrative   ? Not on file          Medications  Allergies   Current Outpatient Medications   Medication Sig Dispense Refill   ? aspirin 325 MG EC tablet Take 325 mg by mouth every evening.     ? metoprolol tartrate (LOPRESSOR) 25 MG tablet Take 12.5 mg by mouth daily.      ? nitroglycerin (NITROSTAT) 0.4 MG SL tablet Place 0.4 mg under the tongue every 5 (five) minutes as needed for chest pain.     ? pravastatin (PRAVACHOL) 80 MG tablet Take 80 mg by mouth at bedtime.     ? acetaminophen (TYLENOL) 325 MG tablet Take 2 tablets (650 mg total) by mouth every 4 (four) hours as needed.  0   ? omeprazole (PRILOSEC) 20 MG capsule 1 tab po bid x 14d, then can decrease to once daily if tolerated (Patient taking differently: Take 20 mg by mouth daily as needed. ) 45 capsule 0     No current facility-administered medications for this visit.     Allergies   Allergen Reactions   ? Sulfa (Sulfonamide Antibiotics) Swelling     Sulfa eye drops resulted in facial swelling   ? Penicillins Itching   ? Blood-Group Specific Substance      Patient has an Anti-Jka antibody. Expect delay if blood is needed for transfusion   ? Iodinated Contrast Media Hives and Other (See Comments)     Annotation: hives 10/2012  And flushing   ? Other Allergy (See Comments)      Contrast Media Ready-Box MISC, 10/19/2012.     ? Penicillin Unknown         Lab Results    Chemistry/lipid CBC Cardiac Enzymes/BNP/TSH/INR   Lab Results   Component Value Date    CHOL 122 12/10/2020    HDL 42 (L) 12/10/2020    LDLCALC 60 12/10/2020    TRIG 102 12/10/2020    CREATININE 0.97 01/07/2021    BUN 15 01/07/2021    K 5.0 01/07/2021     01/07/2021     (H) 01/07/2021    CO2 26 01/07/2021    Lab Results   Component Value Date    WBC 10.9 02/06/2019    HGB 11.1 (L) 02/06/2019    HCT 36.2 02/06/2019    MCV 94 02/06/2019     (H) 02/06/2019    Lab Results   Component Value Date     CKMB 2 10/18/2012    TROPONINI 0.02 05/01/2016    TSH 3.04 12/29/2020    INR 1.31 (H) 01/25/2019

## 2021-07-03 NOTE — ADDENDUM NOTE
Addendum Note by Dallas Rangel MD at 3/11/2019  1:10 PM     Author: Dallas Rangel MD Service: -- Author Type: Physician    Filed: 3/11/2019  2:23 PM Encounter Date: 3/11/2019 Status: Signed    : Dallas Rangel MD (Physician)    Addended by: DALLAS RANGEL on: 3/11/2019 02:23 PM        Modules accepted: Orders

## 2021-07-14 PROBLEM — I20.0 UNSTABLE ANGINA (H): Status: RESOLVED | Noted: 2019-01-22 | Resolved: 2020-07-02

## 2021-11-22 ENCOUNTER — LAB REQUISITION (OUTPATIENT)
Dept: LAB | Facility: CLINIC | Age: 86
End: 2021-11-22
Payer: MEDICARE

## 2021-11-22 DIAGNOSIS — E78.2 MIXED HYPERLIPIDEMIA: ICD-10-CM

## 2021-11-22 DIAGNOSIS — I10 ESSENTIAL (PRIMARY) HYPERTENSION: ICD-10-CM

## 2021-11-22 LAB
ANION GAP SERPL CALCULATED.3IONS-SCNC: 11 MMOL/L (ref 5–18)
BUN SERPL-MCNC: 14 MG/DL (ref 8–28)
CALCIUM SERPL-MCNC: 9.5 MG/DL (ref 8.5–10.5)
CHLORIDE BLD-SCNC: 110 MMOL/L (ref 98–107)
CHOLEST SERPL-MCNC: 144 MG/DL
CO2 SERPL-SCNC: 21 MMOL/L (ref 22–31)
CREAT SERPL-MCNC: 0.91 MG/DL (ref 0.6–1.1)
FASTING STATUS PATIENT QL REPORTED: ABNORMAL
GFR SERPL CREATININE-BSD FRML MDRD: 57 ML/MIN/1.73M2
GLUCOSE BLD-MCNC: 118 MG/DL (ref 70–125)
HDLC SERPL-MCNC: 38 MG/DL
LDLC SERPL CALC-MCNC: 70 MG/DL
POTASSIUM BLD-SCNC: 4.1 MMOL/L (ref 3.5–5)
SODIUM SERPL-SCNC: 142 MMOL/L (ref 136–145)
TRIGL SERPL-MCNC: 180 MG/DL

## 2021-11-22 PROCEDURE — 80048 BASIC METABOLIC PNL TOTAL CA: CPT | Mod: ORL | Performed by: FAMILY MEDICINE

## 2021-11-22 PROCEDURE — 80061 LIPID PANEL: CPT | Mod: ORL | Performed by: FAMILY MEDICINE

## 2022-08-10 ENCOUNTER — LAB REQUISITION (OUTPATIENT)
Dept: LAB | Facility: CLINIC | Age: 87
End: 2022-08-10
Payer: MEDICARE

## 2022-08-10 DIAGNOSIS — R19.7 DIARRHEA, UNSPECIFIED: ICD-10-CM

## 2022-08-10 LAB
ALBUMIN SERPL BCG-MCNC: 4.3 G/DL (ref 3.5–5.2)
ALP SERPL-CCNC: 125 U/L (ref 35–104)
ALT SERPL W P-5'-P-CCNC: 15 U/L (ref 10–35)
ANION GAP SERPL CALCULATED.3IONS-SCNC: 9 MMOL/L (ref 7–15)
AST SERPL W P-5'-P-CCNC: 18 U/L (ref 10–35)
BILIRUB SERPL-MCNC: 0.3 MG/DL
BUN SERPL-MCNC: 16.2 MG/DL (ref 8–23)
CALCIUM SERPL-MCNC: 10.3 MG/DL (ref 8.8–10.2)
CHLORIDE SERPL-SCNC: 109 MMOL/L (ref 98–107)
CREAT SERPL-MCNC: 0.99 MG/DL (ref 0.51–0.95)
DEPRECATED HCO3 PLAS-SCNC: 27 MMOL/L (ref 22–29)
GFR SERPL CREATININE-BSD FRML MDRD: 55 ML/MIN/1.73M2
GLUCOSE SERPL-MCNC: 86 MG/DL (ref 70–99)
POTASSIUM SERPL-SCNC: 4.9 MMOL/L (ref 3.4–5.3)
PROT SERPL-MCNC: 6.7 G/DL (ref 6.4–8.3)
SODIUM SERPL-SCNC: 145 MMOL/L (ref 136–145)

## 2022-08-10 PROCEDURE — 80053 COMPREHEN METABOLIC PANEL: CPT | Mod: ORL | Performed by: NURSE PRACTITIONER

## 2022-12-27 ENCOUNTER — LAB REQUISITION (OUTPATIENT)
Dept: LAB | Facility: CLINIC | Age: 87
End: 2022-12-27
Payer: MEDICARE

## 2022-12-27 DIAGNOSIS — R42 DIZZINESS AND GIDDINESS: ICD-10-CM

## 2022-12-27 LAB
ANION GAP SERPL CALCULATED.3IONS-SCNC: 13 MMOL/L (ref 7–15)
BUN SERPL-MCNC: 15.4 MG/DL (ref 8–23)
CALCIUM SERPL-MCNC: 10.4 MG/DL (ref 8.8–10.2)
CHLORIDE SERPL-SCNC: 106 MMOL/L (ref 98–107)
CREAT SERPL-MCNC: 1.02 MG/DL (ref 0.51–0.95)
DEPRECATED HCO3 PLAS-SCNC: 23 MMOL/L (ref 22–29)
GFR SERPL CREATININE-BSD FRML MDRD: 53 ML/MIN/1.73M2
GLUCOSE SERPL-MCNC: 123 MG/DL (ref 70–99)
POTASSIUM SERPL-SCNC: 4.5 MMOL/L (ref 3.4–5.3)
SODIUM SERPL-SCNC: 142 MMOL/L (ref 136–145)

## 2022-12-27 PROCEDURE — 87086 URINE CULTURE/COLONY COUNT: CPT | Mod: ORL | Performed by: NURSE PRACTITIONER

## 2022-12-27 PROCEDURE — 80048 BASIC METABOLIC PNL TOTAL CA: CPT | Mod: ORL | Performed by: NURSE PRACTITIONER

## 2022-12-29 LAB — BACTERIA UR CULT: NORMAL

## 2023-07-18 ENCOUNTER — LAB REQUISITION (OUTPATIENT)
Dept: LAB | Facility: CLINIC | Age: 88
End: 2023-07-18
Payer: MEDICARE

## 2023-07-18 DIAGNOSIS — E78.2 MIXED HYPERLIPIDEMIA: ICD-10-CM

## 2023-07-18 DIAGNOSIS — I10 ESSENTIAL (PRIMARY) HYPERTENSION: ICD-10-CM

## 2023-07-18 LAB
ANION GAP SERPL CALCULATED.3IONS-SCNC: 11 MMOL/L (ref 7–15)
BUN SERPL-MCNC: 14.1 MG/DL (ref 8–23)
CALCIUM SERPL-MCNC: 9.8 MG/DL (ref 8.8–10.2)
CHLORIDE SERPL-SCNC: 107 MMOL/L (ref 98–107)
CHOLEST SERPL-MCNC: 131 MG/DL
CREAT SERPL-MCNC: 0.99 MG/DL (ref 0.51–0.95)
DEPRECATED HCO3 PLAS-SCNC: 24 MMOL/L (ref 22–29)
GFR SERPL CREATININE-BSD FRML MDRD: 55 ML/MIN/1.73M2
GLUCOSE SERPL-MCNC: 141 MG/DL (ref 70–99)
HDLC SERPL-MCNC: 40 MG/DL
LDLC SERPL CALC-MCNC: 61 MG/DL
NONHDLC SERPL-MCNC: 91 MG/DL
POTASSIUM SERPL-SCNC: 4.8 MMOL/L (ref 3.4–5.3)
SODIUM SERPL-SCNC: 142 MMOL/L (ref 136–145)
TRIGL SERPL-MCNC: 148 MG/DL

## 2023-07-18 PROCEDURE — 80048 BASIC METABOLIC PNL TOTAL CA: CPT | Mod: ORL | Performed by: FAMILY MEDICINE

## 2023-07-18 PROCEDURE — 80061 LIPID PANEL: CPT | Mod: ORL | Performed by: FAMILY MEDICINE

## 2023-10-19 ENCOUNTER — LAB REQUISITION (OUTPATIENT)
Dept: LAB | Facility: CLINIC | Age: 88
End: 2023-10-19
Payer: MEDICARE

## 2023-10-19 DIAGNOSIS — R31.0 GROSS HEMATURIA: ICD-10-CM

## 2023-10-19 PROCEDURE — 87086 URINE CULTURE/COLONY COUNT: CPT | Mod: ORL | Performed by: FAMILY MEDICINE

## 2023-10-22 LAB — BACTERIA UR CULT: NO GROWTH

## 2023-11-24 ENCOUNTER — LAB REQUISITION (OUTPATIENT)
Dept: LAB | Facility: CLINIC | Age: 88
End: 2023-11-24
Payer: MEDICARE

## 2023-11-24 DIAGNOSIS — R30.0 DYSURIA: ICD-10-CM

## 2023-11-24 PROCEDURE — 87086 URINE CULTURE/COLONY COUNT: CPT | Mod: ORL | Performed by: PHYSICIAN ASSISTANT

## 2023-11-26 LAB — BACTERIA UR CULT: NORMAL

## 2024-01-03 ENCOUNTER — LAB REQUISITION (OUTPATIENT)
Dept: LAB | Facility: CLINIC | Age: 89
End: 2024-01-03
Payer: MEDICARE

## 2024-01-03 DIAGNOSIS — Z87.442 PERSONAL HISTORY OF URINARY CALCULI: ICD-10-CM

## 2024-01-03 LAB
ALBUMIN UR-MCNC: 30 MG/DL
APPEARANCE UR: ABNORMAL
BACTERIA #/AREA URNS HPF: ABNORMAL /HPF
BILIRUB UR QL STRIP: NEGATIVE
CAOX CRY #/AREA URNS HPF: ABNORMAL /HPF
COLOR UR AUTO: YELLOW
GLUCOSE UR STRIP-MCNC: NEGATIVE MG/DL
HGB UR QL STRIP: ABNORMAL
KETONES UR STRIP-MCNC: NEGATIVE MG/DL
LEUKOCYTE ESTERASE UR QL STRIP: ABNORMAL
MUCOUS THREADS #/AREA URNS LPF: PRESENT /LPF
NITRATE UR QL: NEGATIVE
PH UR STRIP: 5.5 [PH] (ref 5–7)
RBC URINE: 42 /HPF
SP GR UR STRIP: 1.02 (ref 1–1.03)
SQUAMOUS EPITHELIAL: 3 /HPF
UROBILINOGEN UR STRIP-MCNC: NORMAL MG/DL
WBC URINE: 39 /HPF

## 2024-01-03 PROCEDURE — 81001 URINALYSIS AUTO W/SCOPE: CPT | Mod: ORL | Performed by: STUDENT IN AN ORGANIZED HEALTH CARE EDUCATION/TRAINING PROGRAM

## 2024-01-03 PROCEDURE — 87086 URINE CULTURE/COLONY COUNT: CPT | Mod: ORL | Performed by: STUDENT IN AN ORGANIZED HEALTH CARE EDUCATION/TRAINING PROGRAM

## 2024-01-03 PROCEDURE — 87186 SC STD MICRODIL/AGAR DIL: CPT | Mod: ORL | Performed by: STUDENT IN AN ORGANIZED HEALTH CARE EDUCATION/TRAINING PROGRAM

## 2024-01-04 LAB — BACTERIA UR CULT: ABNORMAL

## 2024-01-22 ENCOUNTER — LAB REQUISITION (OUTPATIENT)
Dept: LAB | Facility: CLINIC | Age: 89
End: 2024-01-22
Payer: MEDICARE

## 2024-01-22 DIAGNOSIS — R31.9 HEMATURIA, UNSPECIFIED: ICD-10-CM

## 2024-01-22 PROCEDURE — 87186 SC STD MICRODIL/AGAR DIL: CPT | Mod: ORL | Performed by: FAMILY MEDICINE

## 2024-01-22 PROCEDURE — 87086 URINE CULTURE/COLONY COUNT: CPT | Mod: ORL | Performed by: FAMILY MEDICINE

## 2024-01-23 LAB — BACTERIA UR CULT: ABNORMAL

## 2024-02-13 ENCOUNTER — LAB REQUISITION (OUTPATIENT)
Dept: LAB | Facility: CLINIC | Age: 89
End: 2024-02-13
Payer: MEDICARE

## 2024-02-13 DIAGNOSIS — I10 ESSENTIAL (PRIMARY) HYPERTENSION: ICD-10-CM

## 2024-02-13 LAB
ANION GAP SERPL CALCULATED.3IONS-SCNC: 13 MMOL/L (ref 7–15)
BUN SERPL-MCNC: 16.1 MG/DL (ref 8–23)
CALCIUM SERPL-MCNC: 10 MG/DL (ref 8.8–10.2)
CHLORIDE SERPL-SCNC: 108 MMOL/L (ref 98–107)
CREAT SERPL-MCNC: 1.01 MG/DL (ref 0.51–0.95)
DEPRECATED HCO3 PLAS-SCNC: 22 MMOL/L (ref 22–29)
EGFRCR SERPLBLD CKD-EPI 2021: 53 ML/MIN/1.73M2
GLUCOSE SERPL-MCNC: 135 MG/DL (ref 70–99)
POTASSIUM SERPL-SCNC: 4.8 MMOL/L (ref 3.4–5.3)
SODIUM SERPL-SCNC: 143 MMOL/L (ref 135–145)

## 2024-02-13 PROCEDURE — 80048 BASIC METABOLIC PNL TOTAL CA: CPT | Mod: ORL | Performed by: FAMILY MEDICINE

## 2024-08-14 ENCOUNTER — LAB REQUISITION (OUTPATIENT)
Dept: LAB | Facility: CLINIC | Age: 89
End: 2024-08-14
Payer: MEDICARE

## 2024-08-14 DIAGNOSIS — E78.2 MIXED HYPERLIPIDEMIA: ICD-10-CM

## 2024-08-14 DIAGNOSIS — I10 ESSENTIAL (PRIMARY) HYPERTENSION: ICD-10-CM

## 2024-08-14 LAB
ANION GAP SERPL CALCULATED.3IONS-SCNC: 12 MMOL/L (ref 7–15)
BUN SERPL-MCNC: 16.5 MG/DL (ref 8–23)
CALCIUM SERPL-MCNC: 9.6 MG/DL (ref 8.8–10.4)
CHLORIDE SERPL-SCNC: 108 MMOL/L (ref 98–107)
CHOLEST SERPL-MCNC: 140 MG/DL
CREAT SERPL-MCNC: 1.01 MG/DL (ref 0.51–0.95)
EGFRCR SERPLBLD CKD-EPI 2021: 53 ML/MIN/1.73M2
FASTING STATUS PATIENT QL REPORTED: ABNORMAL
FASTING STATUS PATIENT QL REPORTED: ABNORMAL
GLUCOSE SERPL-MCNC: 116 MG/DL (ref 70–99)
HCO3 SERPL-SCNC: 23 MMOL/L (ref 22–29)
HDLC SERPL-MCNC: 37 MG/DL
LDLC SERPL CALC-MCNC: 61 MG/DL
NONHDLC SERPL-MCNC: 103 MG/DL
POTASSIUM SERPL-SCNC: 4.7 MMOL/L (ref 3.4–5.3)
SODIUM SERPL-SCNC: 143 MMOL/L (ref 135–145)
TRIGL SERPL-MCNC: 210 MG/DL

## 2024-08-14 PROCEDURE — 80048 BASIC METABOLIC PNL TOTAL CA: CPT | Mod: ORL | Performed by: FAMILY MEDICINE

## 2024-08-14 PROCEDURE — 80061 LIPID PANEL: CPT | Mod: ORL | Performed by: FAMILY MEDICINE

## 2024-11-18 ENCOUNTER — LAB REQUISITION (OUTPATIENT)
Dept: LAB | Facility: CLINIC | Age: 89
End: 2024-11-18
Payer: MEDICARE

## 2024-11-18 DIAGNOSIS — R10.31 RIGHT LOWER QUADRANT PAIN: ICD-10-CM

## 2024-11-18 PROCEDURE — 87186 SC STD MICRODIL/AGAR DIL: CPT | Mod: ORL | Performed by: FAMILY MEDICINE

## 2024-11-20 LAB — BACTERIA UR CULT: ABNORMAL

## 2025-02-11 ENCOUNTER — LAB REQUISITION (OUTPATIENT)
Dept: LAB | Facility: CLINIC | Age: OVER 89
End: 2025-02-11
Payer: MEDICARE

## 2025-02-11 DIAGNOSIS — N18.31 CHRONIC KIDNEY DISEASE, STAGE 3A (H): ICD-10-CM

## 2025-02-12 LAB
ANION GAP SERPL CALCULATED.3IONS-SCNC: 15 MMOL/L (ref 7–15)
BUN SERPL-MCNC: 16 MG/DL (ref 8–23)
CALCIUM SERPL-MCNC: 10 MG/DL (ref 8.8–10.4)
CHLORIDE SERPL-SCNC: 108 MMOL/L (ref 98–107)
CREAT SERPL-MCNC: 1 MG/DL (ref 0.51–0.95)
EGFRCR SERPLBLD CKD-EPI 2021: 54 ML/MIN/1.73M2
GLUCOSE SERPL-MCNC: 75 MG/DL (ref 70–99)
HCO3 SERPL-SCNC: 22 MMOL/L (ref 22–29)
POTASSIUM SERPL-SCNC: 4.6 MMOL/L (ref 3.4–5.3)
SODIUM SERPL-SCNC: 145 MMOL/L (ref 135–145)

## 2025-07-28 RX ORDER — DIPHENHYDRAMINE HCL 50 MG
CAPSULE ORAL
Qty: 1 CAPSULE | Refills: 0 | Status: SHIPPED | OUTPATIENT
Start: 2025-07-28

## 2025-07-28 RX ORDER — NITROGLYCERIN 0.4 MG/1
0.4 TABLET SUBLINGUAL
Status: DISCONTINUED | OUTPATIENT
Start: 2025-07-28 | End: 2025-07-30 | Stop reason: HOSPADM

## 2025-07-28 RX ORDER — METOPROLOL TARTRATE 1 MG/ML
5-20 INJECTION, SOLUTION INTRAVENOUS
Status: DISCONTINUED | OUTPATIENT
Start: 2025-07-28 | End: 2025-07-30 | Stop reason: HOSPADM

## 2025-07-28 RX ORDER — DILTIAZEM HYDROCHLORIDE 5 MG/ML
10-15 INJECTION INTRAVENOUS
Status: DISCONTINUED | OUTPATIENT
Start: 2025-07-28 | End: 2025-07-30 | Stop reason: HOSPADM

## 2025-07-28 RX ORDER — METHYLPREDNISOLONE 32 MG/1
TABLET ORAL
Qty: 2 TABLET | Refills: 0 | Status: SHIPPED | OUTPATIENT
Start: 2025-07-28

## 2025-07-29 ENCOUNTER — HOSPITAL ENCOUNTER (OUTPATIENT)
Dept: CT IMAGING | Facility: CLINIC | Age: OVER 89
Discharge: HOME | End: 2025-07-29
Attending: INTERNAL MEDICINE
Payer: MEDICARE

## 2025-07-29 VITALS
RESPIRATION RATE: 16 BRPM | HEART RATE: 64 BPM | DIASTOLIC BLOOD PRESSURE: 60 MMHG | SYSTOLIC BLOOD PRESSURE: 130 MMHG | OXYGEN SATURATION: 98 %

## 2025-07-29 DIAGNOSIS — R42 DIZZINESS: ICD-10-CM

## 2025-07-29 DIAGNOSIS — Z95.1 STATUS POST CARDIAC REVASCULARIZATION WITH BYPASS AORTOCORONARY ANASTOMOSIS OF FIVE CORONARY VESSELS: Primary | ICD-10-CM

## 2025-07-29 DIAGNOSIS — I25.10 CAD (CORONARY ARTERY DISEASE): ICD-10-CM

## 2025-07-29 LAB
BSA FOR ECHO PROCEDURE: 0 M2
CREAT BLD-MCNC: 1 MG/DL (ref 0.5–1)
EGFRCR SERPLBLD CKD-EPI 2021: 53 ML/MIN/1.73M2

## 2025-07-29 PROCEDURE — 75574 CT ANGIO HRT W/3D IMAGE: CPT | Mod: 26 | Performed by: INTERNAL MEDICINE

## 2025-07-29 PROCEDURE — 75574 CT ANGIO HRT W/3D IMAGE: CPT

## 2025-07-29 PROCEDURE — 250N000011 HC RX IP 250 OP 636: Performed by: INTERNAL MEDICINE

## 2025-07-29 PROCEDURE — 82565 ASSAY OF CREATININE: CPT

## 2025-07-29 PROCEDURE — 250N000013 HC RX MED GY IP 250 OP 250 PS 637: Performed by: INTERNAL MEDICINE

## 2025-07-29 RX ORDER — IOPAMIDOL 755 MG/ML
100 INJECTION, SOLUTION INTRAVASCULAR ONCE
Status: COMPLETED | OUTPATIENT
Start: 2025-07-29 | End: 2025-07-29

## 2025-07-29 RX ADMIN — NITROGLYCERIN 0.8 MG: 0.4 TABLET SUBLINGUAL at 10:40

## 2025-07-29 RX ADMIN — IOPAMIDOL 100 ML: 755 INJECTION, SOLUTION INTRAVENOUS at 10:44

## 2025-08-12 ENCOUNTER — LAB REQUISITION (OUTPATIENT)
Dept: LAB | Facility: CLINIC | Age: OVER 89
End: 2025-08-12
Payer: MEDICARE

## 2025-08-12 DIAGNOSIS — N18.31 CHRONIC KIDNEY DISEASE, STAGE 3A (H): ICD-10-CM

## 2025-08-12 LAB
ANION GAP SERPL CALCULATED.3IONS-SCNC: 9 MMOL/L (ref 7–15)
BUN SERPL-MCNC: 13.7 MG/DL (ref 8–23)
CALCIUM SERPL-MCNC: 9.6 MG/DL (ref 8.8–10.4)
CHLORIDE SERPL-SCNC: 109 MMOL/L (ref 98–107)
CREAT SERPL-MCNC: 1.05 MG/DL (ref 0.51–0.95)
EGFRCR SERPLBLD CKD-EPI 2021: 50 ML/MIN/1.73M2
GLUCOSE SERPL-MCNC: 136 MG/DL (ref 70–99)
HCO3 SERPL-SCNC: 23 MMOL/L (ref 22–29)
POTASSIUM SERPL-SCNC: 4.3 MMOL/L (ref 3.4–5.3)
SODIUM SERPL-SCNC: 141 MMOL/L (ref 135–145)